# Patient Record
Sex: MALE | Race: WHITE | Employment: FULL TIME | URBAN - METROPOLITAN AREA
[De-identification: names, ages, dates, MRNs, and addresses within clinical notes are randomized per-mention and may not be internally consistent; named-entity substitution may affect disease eponyms.]

---

## 2017-04-24 ENCOUNTER — OFFICE VISIT (OUTPATIENT)
Dept: FAMILY MEDICINE | Facility: OTHER | Age: 42
End: 2017-04-24
Attending: NURSE PRACTITIONER
Payer: COMMERCIAL

## 2017-04-24 VITALS
SYSTOLIC BLOOD PRESSURE: 120 MMHG | HEART RATE: 98 BPM | BODY MASS INDEX: 36.68 KG/M2 | OXYGEN SATURATION: 98 % | HEIGHT: 71 IN | DIASTOLIC BLOOD PRESSURE: 71 MMHG | WEIGHT: 262 LBS | TEMPERATURE: 98 F

## 2017-04-24 DIAGNOSIS — F40.10 SOCIAL ANXIETY DISORDER: ICD-10-CM

## 2017-04-24 DIAGNOSIS — G43.719 INTRACTABLE CHRONIC MIGRAINE WITHOUT AURA AND WITHOUT STATUS MIGRAINOSUS: ICD-10-CM

## 2017-04-24 DIAGNOSIS — Z71.89 ACP (ADVANCE CARE PLANNING): ICD-10-CM

## 2017-04-24 DIAGNOSIS — F41.9 ANXIETY: ICD-10-CM

## 2017-04-24 DIAGNOSIS — F33.1 MODERATE EPISODE OF RECURRENT MAJOR DEPRESSIVE DISORDER (H): Primary | ICD-10-CM

## 2017-04-24 PROCEDURE — 99214 OFFICE O/P EST MOD 30 MIN: CPT | Performed by: NURSE PRACTITIONER

## 2017-04-24 RX ORDER — SUMATRIPTAN 50 MG/1
50 TABLET, FILM COATED ORAL
Qty: 30 TABLET | Refills: 3 | Status: SHIPPED | OUTPATIENT
Start: 2017-04-24 | End: 2019-01-09

## 2017-04-24 RX ORDER — VENLAFAXINE 75 MG/1
75 TABLET ORAL 3 TIMES DAILY
Qty: 90 TABLET | Refills: 5 | Status: SHIPPED | OUTPATIENT
Start: 2017-04-24 | End: 2017-11-13

## 2017-04-24 RX ORDER — ARIPIPRAZOLE 5 MG/1
5 TABLET ORAL AT BEDTIME
Qty: 30 TABLET | Refills: 1 | Status: SHIPPED | OUTPATIENT
Start: 2017-04-24 | End: 2017-11-13

## 2017-04-24 RX ORDER — CLONAZEPAM 1 MG/1
1 TABLET ORAL 3 TIMES DAILY PRN
Qty: 90 TABLET | Refills: 5 | Status: SHIPPED | OUTPATIENT
Start: 2017-04-24 | End: 2017-11-13

## 2017-04-24 ASSESSMENT — ANXIETY QUESTIONNAIRES
IF YOU CHECKED OFF ANY PROBLEMS ON THIS QUESTIONNAIRE, HOW DIFFICULT HAVE THESE PROBLEMS MADE IT FOR YOU TO DO YOUR WORK, TAKE CARE OF THINGS AT HOME, OR GET ALONG WITH OTHER PEOPLE: NOT DIFFICULT AT ALL
3. WORRYING TOO MUCH ABOUT DIFFERENT THINGS: SEVERAL DAYS
6. BECOMING EASILY ANNOYED OR IRRITABLE: NOT AT ALL
7. FEELING AFRAID AS IF SOMETHING AWFUL MIGHT HAPPEN: SEVERAL DAYS
1. FEELING NERVOUS, ANXIOUS, OR ON EDGE: SEVERAL DAYS
2. NOT BEING ABLE TO STOP OR CONTROL WORRYING: NOT AT ALL

## 2017-04-24 ASSESSMENT — PATIENT HEALTH QUESTIONNAIRE - PHQ9: 5. POOR APPETITE OR OVEREATING: NOT AT ALL

## 2017-04-24 ASSESSMENT — PAIN SCALES - GENERAL: PAINLEVEL: NO PAIN (0)

## 2017-04-24 NOTE — NURSING NOTE
"Chief Complaint   Patient presents with     Recheck Medication       Initial /71 (BP Location: Right arm, Cuff Size: Adult Large)  Pulse 98  Temp 98  F (36.7  C) (Tympanic)  Ht 5' 11\" (1.803 m)  Wt 262 lb (118.8 kg)  SpO2 98%  BMI 36.54 kg/m2 Estimated body mass index is 36.54 kg/(m^2) as calculated from the following:    Height as of this encounter: 5' 11\" (1.803 m).    Weight as of this encounter: 262 lb (118.8 kg).  Medication Reconciliation: complete     Lesley Ansari      "

## 2017-04-24 NOTE — PATIENT INSTRUCTIONS
ASSESSMENT AND PLAN    1. Moderate episode of recurrent major depressive disorder (H)  - ARIPiprazole (ABILIFY) 5 MG tablet; Take 1 tablet (5 mg) by mouth At Bedtime  Dispense: 30 tablet; Refill: 1  - clonazePAM (KLONOPIN) 1 MG tablet; Take 1 tablet (1 mg) by mouth 3 times daily as needed for anxiety  Dispense: 90 tablet; Refill: 5  - venlafaxine (EFFEXOR) 75 MG tablet; Take 1 tablet (75 mg) by mouth 3 times daily  Dispense: 90 tablet; Refill: 5    2. Anxiety  - clonazePAM (KLONOPIN) 1 MG tablet; Take 1 tablet (1 mg) by mouth 3 times daily as needed for anxiety  Dispense: 90 tablet; Refill: 5  - venlafaxine (EFFEXOR) 75 MG tablet; Take 1 tablet (75 mg) by mouth 3 times daily  Dispense: 90 tablet; Refill: 5    3. Intractable chronic migraine without aura and without status migrainosus  - SUMAtriptan (IMITREX) 50 MG tablet; Take 1 tablet (50 mg) by mouth at onset of headache for migraine TAKE 1 AT ONSET OF HEADACHE, MAY REPEAT AFTER 2 HR, NOT TO EXEED 200MG DAILY  Dispense: 30 tablet; Refill: 3    4. ACP (advance care planning)  - Addressed    5. Social anxiety disorder  - clonazePAM (KLONOPIN) 1 MG tablet; Take 1 tablet (1 mg) by mouth 3 times daily as needed for anxiety  Dispense: 90 tablet; Refill: 5  - venlafaxine (EFFEXOR) 75 MG tablet; Take 1 tablet (75 mg) by mouth 3 times daily  Dispense: 90 tablet; Refill: 5          Yelitza Roche [facility]  [phone]                                    My Depression Action Plan  Name: Toño Gutierrez   Date of Birth 1975  Date: 4/24/2017    My doctor: Yelitza Roche   My clinic: [address] [address]  [phone]          [facility]   Good Control    What it looks like:     Things are going generally well. You have normal up s and down s. You may even feel depressed from time to time, but bad moods usually last less than a day.   What you need to do:  1. Continue to care for yourself (see self care plan)  2. Check  your depression survival kit and update it as needed  3. Follow your physician s recommendations including any medication.  4. Do not stop taking medication unless you consult with your physician first.           YELLOW         ZONE Getting Worse    What it looks like:     Depression is starting to interfere with your life.     It may be hard to get out of bed; you may be starting to isolate yourself from others.    Symptoms of depression are starting to last most all day and this has happened for several days.     You may have suicidal thoughts but they are not constant.   What you need to do:     1. Call your care team, your response to treatment will improve if you keep your care team informed of your progress. Yellow periods are signs an adjustment may need to be made.     2. Continue your self-care, even if you have to fake it!    3. Talk to someone in your support network    4. Open up your depression survival kit           RED    ZONE Medical Alert - Get Help    What it looks like:     Depression is seriously interfering with your life.     You may experience these or other symptoms: You can t get out of bed most days, can t work or engage in other necessary activities, you have trouble taking care of basic hygiene, or basic responsibilities, thoughts of suicide or death that will not go away, self-injurious behavior.     What you need to do:  1. Call your care team and request a same-day appointment. If they are not available (weekends or after hours) call your local crisis line, emergency room or 911.      Electronically signed by: eYlitza Roche, April 24, 2017    Depression Self Care Plan / Survival Kit    Self-Care for Depression  Here s the deal. Your body and mind are really not as separate as most people think.  What you do and think affects how you feel and how you feel influences what you do and think. This means if you do things that people who feel good do, it will help you feel better.  Sometimes  this is all it takes.  There is also a place for medication and therapy depending on how severe your depression is, so be sure to consult with your medical provider and/ or Behavioral Health Consultant if your symptoms are worsening or not improving.     In order to better manage my stress, I will:    Exercise  Get some form of exercise, every day. This will help reduce pain and release endorphins, the  feel good  chemicals in your brain. This is almost as good as taking antidepressants!  This is not the same as joining a gym and then never going! (they count on that by the way ) It can be as simple as just going for a walk or doing some gardening, anything that will get you moving.      Hygiene   Maintain good hygiene (Get out of bed in the morning, Make your bed, Brush your teeth, Take a shower, and Get dressed like you were going to work, even if you are unemployed).  If your clothes don't fit try to get ones that do.    Diet  I will strive to eat foods that are good for me, drink plenty of water, and avoid excessive sugar, caffeine, alcohol, and other mood-altering substances.  Some foods that are helpful in depression are: complex carbohydrates, B vitamins, flaxseed, fish or fish oil, fresh fruits and vegetables.    Psychotherapy  I agree to participate in Individual Therapy (if recommended).    Medication  If prescribed medications, I agree to take them.  Missing doses can result in serious side effects.  I understand that drinking alcohol, or other illicit drug use, may cause potential side effects.  I will not stop my medication abruptly without first discussing it with my provider.    Staying Connected With Others  I will stay in touch with my friends, family members, and my primary care provider/team.    Use your imagination  Be creative.  We all have a creative side; it doesn t matter if it s oil painting, sand castles, or mud pies! This will also kick up the endorphins.    Witness Beauty  (AKA stop and  smell the roses) Take a look outside, even in mid-winter. Notice colors, textures. Watch the squirrels and birds.     Service to others  Be of service to others.  There is always someone else in need.  By helping others we can  get out of ourselves  and remember the really important things.  This also provides opportunities for practicing all the other parts of the program.    Humor  Laugh and be silly!  Adjust your TV habits for less news and crime-drama and more comedy.    Control your stress  Try breathing deep, massage therapy, biofeedback, and meditation. Find time to relax each day.     My support system    Clinic Contact:  Phone number:    Contact 1:  Phone number:    Contact 2:  Phone number:    Hindu/:  Phone number:    Therapist:  Phone number:    Local crisis center:    Phone number:    Other community support:  Phone number:

## 2017-04-24 NOTE — PROGRESS NOTES
OUTPATIENT VISIT NOTE      CHIEF COMPLAINT:     Chief Complaint   Patient presents with     Recheck Medication        SUBJECTIVE  Patient presents with a chief complaint of  Deression, anxiety        HPI:  Symptoms have been present for -  years  Aggravating factors  - life  Relieving factors -  meds  Recent stressors  - job loss  Drug or alcohol use -  no  Past medications  - see chart  Therapy  - no  Psychiatric history -  As in epic  Prior hospitalization for mental health concerns -  no  Suicidal Ideation or plan  - no      Migraines, stable, on Imitrex - no concerns    Past Medical History:   Diagnosis Date     Anxiety 7/23/2012     Chronic migraine without aura, with intractable migraine, so stated, without mention of status migrainosus 10/13/2014     Depression, major, recurrent 7/23/2012     ED (erectile dysfunction) 10/13/2014     Social anxiety disorder 10/13/2014     Past Surgical History:   Procedure Laterality Date     CIRCUMCISION       ORTHOPEDIC SURGERY  2003    rt ruthie teixeira'ed and pinned     ring finger open reduction internal fixation      right     Family History   Problem Relation Age of Onset     Crohn Disease Sister      Hypertension Maternal Grandmother      Social History     Social History     Marital status: Single     Spouse name: N/A     Number of children: N/A     Years of education: N/A     Occupational History     [employer]      [employer]     Social History Main Topics     Smoking status: Former Smoker     Packs/day: 0.50     Types: Cigarettes     Smokeless tobacco: Former User     Quit date: 2/13/2014     Alcohol use Yes     Drug use: No     Sexual activity: Not on file     Other Topics Concern     Blood Transfusions Yes     Caffeine Concern Yes     2 cups energy drinks daily     Exercise Yes     walking, cycling     Parent/Sibling W/ Cabg, Mi Or Angioplasty Before 65f 55m? Yes     Social History Narrative     Allergies   Allergen Reactions     Erythromycin Base  "[Kdc:Yellow Dye+Erythromycin+Brilliant Blue Fcf]        Current Outpatient Prescriptions   Medication     ARIPiprazole (ABILIFY) 5 MG tablet     venlafaxine (EFFEXOR) 75 MG tablet     clonazePAM (KLONOPIN) 1 MG tablet     SUMAtriptan (IMITREX) 50 MG tablet     No current facility-administered medications for this visit.          REVIEW OF SYSTEMS  Skin: negative  Eyes: negative  Ears/Nose/Throat: negative  Respiratory: No shortness of breath, dyspnea on exertion, cough, or hemoptysis  Cardiovascular: negative  Gastrointestinal: negative  Musculoskeletal: negative  Neurologic: negative  Psychiatric: As above  Endocrine: negative      OBJECTIVE:   /71 (BP Location: Right arm, Cuff Size: Adult Large)  Pulse 98  Temp 98  F (36.7  C) (Tympanic)  Ht 5' 11\" (1.803 m)  Wt 262 lb (118.8 kg)  SpO2 98%  BMI 36.54 kg/m2    Mental Status Assessment:  -Appearance/Behavior:No apparent distress and Casually groomed, attitude:pleasant and cooperative  -Motor: normal or unremarkable.  -Gait: Normal.   -Abnormal involuntary movements: None.  -Mood: description consistent with euthymia.  -Affect: Reactive/Full range.  -Speech: Normal, clear, regular rate and volume.   -Thought process/associations: Logical and Goal directed.  -Thought content: normal .  -Perceptual disturbances: No hallucinations..   -Suicidal/Homicidal Ideation: Denies  -Judgment: Good.  -Insight: Good.  *Orientation: time, place and person.  *Memory: intact immediate, short and long term.  *Attention: Adequate for interview  *Language: fluent, no aphasias, able to repeat phrases and name objects. Vocab intact.  *Fund of information: appropriate for education  *Cognitive functioning estimate: 0 - independent.        Physical Exam:  Head: Normocephalic.   Neck: Neck supple. No adenopathy.   ENT: ENT exam normal, no neck nodes or sinus tenderness  Cardiovascular: negative, PMI normal.  No murmurs, clicks gallops or rub  Respiratory:  Good diaphragmatic " excursion. Lungs clear  Abdomen: Soft, non tender, active bowel sounds  Neurologic: Gait normal.  Sensation grossly  Skin: Normal, no cuts, or other notable abnormal findings    PHQ-9 SCORE 9/23/2015 10/4/2016 11/4/2016   Total Score - - -   Total Score 1 4 7     AMRIT-7 SCORE 10/4/2016 11/4/2016   Total Score 9 3           ASSESSMENT AND PLAN    1. Moderate episode of recurrent major depressive disorder (H)  - ARIPiprazole (ABILIFY) 5 MG tablet; Take 1 tablet (5 mg) by mouth At Bedtime  Dispense: 30 tablet; Refill: 1  - clonazePAM (KLONOPIN) 1 MG tablet; Take 1 tablet (1 mg) by mouth 3 times daily as needed for anxiety  Dispense: 90 tablet; Refill: 5  - venlafaxine (EFFEXOR) 75 MG tablet; Take 1 tablet (75 mg) by mouth 3 times daily  Dispense: 90 tablet; Refill: 5    2. Anxiety  - clonazePAM (KLONOPIN) 1 MG tablet; Take 1 tablet (1 mg) by mouth 3 times daily as needed for anxiety  Dispense: 90 tablet; Refill: 5  - venlafaxine (EFFEXOR) 75 MG tablet; Take 1 tablet (75 mg) by mouth 3 times daily  Dispense: 90 tablet; Refill: 5    3. Intractable chronic migraine without aura and without status migrainosus  - SUMAtriptan (IMITREX) 50 MG tablet; Take 1 tablet (50 mg) by mouth at onset of headache for migraine TAKE 1 AT ONSET OF HEADACHE, MAY REPEAT AFTER 2 HR, NOT TO EXEED 200MG DAILY  Dispense: 30 tablet; Refill: 3    4. ACP (advance care planning)  - Addressed    5. Social anxiety disorder  - clonazePAM (KLONOPIN) 1 MG tablet; Take 1 tablet (1 mg) by mouth 3 times daily as needed for anxiety  Dispense: 90 tablet; Refill: 5  - venlafaxine (EFFEXOR) 75 MG tablet; Take 1 tablet (75 mg) by mouth 3 times daily  Dispense: 90 tablet; Refill: 5          Yelitza Roche [facility]  [phone]

## 2017-04-24 NOTE — MR AVS SNAPSHOT
After Visit Summary   4/24/2017    Toño Gutierrez    MRN: [id number]           Patient Information     Date Of Birth          1975        Visit Information        Provider Department      4/24/2017 11:00 AM Yelitza Roche NP [facility]'s Diagnoses     Moderate episode of recurrent major depressive disorder (H)    -  1    Anxiety        Intractable chronic migraine without aura and without status migrainosus        ACP (advance care planning)        Social anxiety disorder          Care Instructions        ASSESSMENT AND PLAN    1. Moderate episode of recurrent major depressive disorder (H)  - ARIPiprazole (ABILIFY) 5 MG tablet; Take 1 tablet (5 mg) by mouth At Bedtime  Dispense: 30 tablet; Refill: 1  - clonazePAM (KLONOPIN) 1 MG tablet; Take 1 tablet (1 mg) by mouth 3 times daily as needed for anxiety  Dispense: 90 tablet; Refill: 5  - venlafaxine (EFFEXOR) 75 MG tablet; Take 1 tablet (75 mg) by mouth 3 times daily  Dispense: 90 tablet; Refill: 5    2. Anxiety  - clonazePAM (KLONOPIN) 1 MG tablet; Take 1 tablet (1 mg) by mouth 3 times daily as needed for anxiety  Dispense: 90 tablet; Refill: 5  - venlafaxine (EFFEXOR) 75 MG tablet; Take 1 tablet (75 mg) by mouth 3 times daily  Dispense: 90 tablet; Refill: 5    3. Intractable chronic migraine without aura and without status migrainosus  - SUMAtriptan (IMITREX) 50 MG tablet; Take 1 tablet (50 mg) by mouth at onset of headache for migraine TAKE 1 AT ONSET OF HEADACHE, MAY REPEAT AFTER 2 HR, NOT TO EXEED 200MG DAILY  Dispense: 30 tablet; Refill: 3    4. ACP (advance care planning)  - Addressed    5. Social anxiety disorder  - clonazePAM (KLONOPIN) 1 MG tablet; Take 1 tablet (1 mg) by mouth 3 times daily as needed for anxiety  Dispense: 90 tablet; Refill: 5  - venlafaxine (EFFEXOR) 75 MG tablet; Take 1 tablet (75 mg) by mouth 3 times daily  Dispense: 90 tablet; Refill: 5          Yelitza Roche [facility]  [phone]                                     My Depression Action Plan  Name: Toño Gutierrez   Date of Birth 1975  Date: 4/24/2017    My doctor: Yelitza Roche   My clinic: [facility]  [address] [place] [address] [address]  [facility] [address]  [phone]          GREEN    ZONE   Good Control    What it looks like:     Things are going generally well. You have normal up s and down s. You may even feel depressed from time to time, but bad moods usually last less than a day.   What you need to do:  1. Continue to care for yourself (see self care plan)  2. Check your depression survival kit and update it as needed  3. Follow your physician s recommendations including any medication.  4. Do not stop taking medication unless you consult with your physician first.           YELLOW         ZONE Getting Worse    What it looks like:     Depression is starting to interfere with your life.     It may be hard to get out of bed; you may be starting to isolate yourself from others.    Symptoms of depression are starting to last most all day and this has happened for several days.     You may have suicidal thoughts but they are not constant.   What you need to do:     1. Call your care team, your response to treatment will improve if you keep your care team informed of your progress. Yellow periods are signs an adjustment may need to be made.     2. Continue your self-care, even if you have to fake it!    3. Talk to someone in your support network    4. Open up your depression survival kit           RED    ZONE Medical Alert - Get Help    What it looks like:     Depression is seriously interfering with your life.     You may experience these or other symptoms: You can t get out of bed most days, can t work or engage in other necessary activities, you have trouble taking care of basic hygiene, or basic responsibilities, thoughts of suicide or death that will not go away, self-injurious behavior.     What you need to do:  1. Call your care team  and request a same-day appointment. If they are not available (weekends or after hours) call your local crisis line, emergency room or 911.      Electronically signed by: Yelitza Roche, April 24, 2017    Depression Self Care Plan / Survival Kit    Self-Care for Depression  Here s the deal. Your body and mind are really not as separate as most people think.  What you do and think affects how you feel and how you feel influences what you do and think. This means if you do things that people who feel good do, it will help you feel better.  Sometimes this is all it takes.  There is also a place for medication and therapy depending on how severe your depression is, so be sure to consult with your medical provider and/ or Behavioral Health Consultant if your symptoms are worsening or not improving.     In order to better manage my stress, I will:    Exercise  Get some form of exercise, every day. This will help reduce pain and release endorphins, the  feel good  chemicals in your brain. This is almost as good as taking antidepressants!  This is not the same as joining a gym and then never going! (they count on that by the way ) It can be as simple as just going for a walk or doing some gardening, anything that will get you moving.      Hygiene   Maintain good hygiene (Get out of bed in the morning, Make your bed, Brush your teeth, Take a shower, and Get dressed like you were going to work, even if you are unemployed).  If your clothes don't fit try to get ones that do.    Diet  I will strive to eat foods that are good for me, drink plenty of water, and avoid excessive sugar, caffeine, alcohol, and other mood-altering substances.  Some foods that are helpful in depression are: complex carbohydrates, B vitamins, flaxseed, fish or fish oil, fresh fruits and vegetables.    Psychotherapy  I agree to participate in Individual Therapy (if recommended).    Medication  If prescribed medications, I agree to take them.  Missing doses  can result in serious side effects.  I understand that drinking alcohol, or other illicit drug use, may cause potential side effects.  I will not stop my medication abruptly without first discussing it with my provider.    Staying Connected With Others  I will stay in touch with my friends, family members, and my primary care provider/team.    Use your imagination  Be creative.  We all have a creative side; it doesn t matter if it s oil painting, sand castles, or mud pies! This will also kick up the endorphins.    Witness Beauty  (AKA stop and smell the roses) Take a look outside, even in mid-winter. Notice colors, textures. Watch the squirrels and birds.     Service to others  Be of service to others.  There is always someone else in need.  By helping others we can  get out of ourselves  and remember the really important things.  This also provides opportunities for practicing all the other parts of the program.    Humor  Laugh and be silly!  Adjust your TV habits for less news and crime-drama and more comedy.    Control your stress  Try breathing deep, massage therapy, biofeedback, and meditation. Find time to relax each day.     My support system    Clinic Contact:  Phone number:    Contact 1:  Phone number:    Contact 2:  Phone number:    [employer]:  Phone number:    Therapist:  Phone number:    [facility] center:    Phone number:    Other community support:  Phone number:            Follow-ups after your visit        Who to contact     If you have questions or need follow up information about today's clinic visit or your schedule please contact [facility] directly at [phone].  Normal or non-critical lab and imaging results will be communicated to you by MyChart, letter or phone within 4 business days after the clinic has received the results. If you do not hear from us within 7 days, please contact the clinic through MyChart or phone. If you have a critical or abnormal lab  "result, we will notify you by phone as soon as possible.  Submit refill requests through UpNext or call your pharmacy and they will forward the refill request to us. Please allow 3 business days for your refill to be completed.          Additional Information About Your Visit        NeuroQuesthart Information     UpNext lets you send messages to your doctor, view your test results, renew your prescriptions, schedule appointments and more. To sign up, go to [email or web address] . Click on \"Log in\" on the left side of the screen, which will take you to the Welcome page. Then click on \"Sign up Now\" on the right side of the page.     You will be asked to enter the access code listed below, as well as some personal information. Please follow the directions to create your username and password.     Your access code is: [email or web address]  Expires: 2017 11:42 AM     Your access code will  in 90 days. If you need help or a new code, please call your [place] clinic or [phone].        Care EveryWhere ID     This is your Care EveryWhere ID. This could be used by other organizations to access your [place] medical records  [id number]        Your Vitals Were     Pulse Temperature Height Pulse Oximetry BMI (Body Mass Index)       98 98  F (36.7  C) (Tympanic) 5' 11\" (1.803 m) 98% 36.54 kg/m2        Blood Pressure from Last 3 Encounters:   17 120/71   16 (!) 128/100   10/04/16 120/90    Weight from Last 3 Encounters:   17 262 lb (118.8 kg)   16 244 lb (110.7 kg)   10/04/16 242 lb 6.4 oz (110 kg)              Today, you had the following     No orders found for display         Where to get your medicines      These medications were sent to [id number] [place] [facility]  [address] AT [facility] [address] [address]     Phone:  [phone]     ARIPiprazole 5 MG tablet    venlafaxine 75 MG tablet         Some of these will need a paper " prescription and others can be bought over the counter.  Ask your nurse if you have questions.     Bring a paper prescription for each of these medications     clonazePAM 1 MG tablet    SUMAtriptan 50 MG tablet          Primary Care Provider Office Phone # Fax #    Yelitza RocheAAKASH [phone]       [address] [address]        Thank you!     Thank you for choosing [facility]  for your care. Our goal is always to provide you with excellent care. Hearing back from our patients is one way we can continue to improve our services. Please take a few minutes to complete the written survey that you may receive in the mail after your visit with us. Thank you!             Your Updated Medication List - Protect others around you: Learn how to safely use, store and throw away your medicines at www.disposemymeds.org.          This list is accurate as of: 4/24/17 11:42 AM.  Always use your most recent med list.                   Brand Name Dispense Instructions for use    ARIPiprazole 5 MG tablet    ABILIFY    30 tablet    Take 1 tablet (5 mg) by mouth At Bedtime       clonazePAM 1 MG tablet    klonoPIN    90 tablet    Take 1 tablet (1 mg) by mouth 3 times daily as needed for anxiety       SUMAtriptan 50 MG tablet    IMITREX    30 tablet    Take 1 tablet (50 mg) by mouth at onset of headache for migraine TAKE 1 AT ONSET OF HEADACHE, MAY REPEAT AFTER 2 HR, NOT TO EXEED 200MG DAILY       venlafaxine 75 MG tablet    EFFEXOR    90 tablet    Take 1 tablet (75 mg) by mouth 3 times daily

## 2017-04-25 ASSESSMENT — PATIENT HEALTH QUESTIONNAIRE - PHQ9: SUM OF ALL RESPONSES TO PHQ QUESTIONS 1-9: 2

## 2017-05-27 DIAGNOSIS — F40.10 SOCIAL ANXIETY DISORDER: ICD-10-CM

## 2017-05-27 DIAGNOSIS — F33.1 MODERATE EPISODE OF RECURRENT MAJOR DEPRESSIVE DISORDER (H): ICD-10-CM

## 2017-05-27 DIAGNOSIS — F41.9 ANXIETY: ICD-10-CM

## 2017-05-30 RX ORDER — CLONAZEPAM 1 MG/1
TABLET ORAL
Qty: 90 TABLET | Refills: 0 | OUTPATIENT
Start: 2017-05-30

## 2017-11-13 ENCOUNTER — OFFICE VISIT (OUTPATIENT)
Dept: FAMILY MEDICINE | Facility: OTHER | Age: 42
End: 2017-11-13
Attending: NURSE PRACTITIONER
Payer: COMMERCIAL

## 2017-11-13 VITALS
SYSTOLIC BLOOD PRESSURE: 138 MMHG | DIASTOLIC BLOOD PRESSURE: 102 MMHG | HEART RATE: 80 BPM | WEIGHT: 265 LBS | BODY MASS INDEX: 37.1 KG/M2 | RESPIRATION RATE: 14 BRPM | HEIGHT: 71 IN

## 2017-11-13 DIAGNOSIS — F41.9 ANXIETY: ICD-10-CM

## 2017-11-13 DIAGNOSIS — F33.1 MODERATE EPISODE OF RECURRENT MAJOR DEPRESSIVE DISORDER (H): ICD-10-CM

## 2017-11-13 DIAGNOSIS — I10 BENIGN ESSENTIAL HYPERTENSION: ICD-10-CM

## 2017-11-13 DIAGNOSIS — F40.10 SOCIAL ANXIETY DISORDER: ICD-10-CM

## 2017-11-13 DIAGNOSIS — Z23 NEED FOR PROPHYLACTIC VACCINATION AND INOCULATION AGAINST INFLUENZA: Primary | ICD-10-CM

## 2017-11-13 PROCEDURE — 99214 OFFICE O/P EST MOD 30 MIN: CPT | Performed by: NURSE PRACTITIONER

## 2017-11-13 PROCEDURE — 84443 ASSAY THYROID STIM HORMONE: CPT | Mod: ZL | Performed by: NURSE PRACTITIONER

## 2017-11-13 PROCEDURE — 90686 IIV4 VACC NO PRSV 0.5 ML IM: CPT | Performed by: NURSE PRACTITIONER

## 2017-11-13 PROCEDURE — 80048 BASIC METABOLIC PNL TOTAL CA: CPT | Mod: ZL | Performed by: NURSE PRACTITIONER

## 2017-11-13 PROCEDURE — 90471 IMMUNIZATION ADMIN: CPT | Performed by: NURSE PRACTITIONER

## 2017-11-13 PROCEDURE — 36415 COLL VENOUS BLD VENIPUNCTURE: CPT | Mod: ZL | Performed by: NURSE PRACTITIONER

## 2017-11-13 PROCEDURE — 99212 OFFICE O/P EST SF 10 MIN: CPT | Mod: 25

## 2017-11-13 RX ORDER — CLONAZEPAM 1 MG/1
1 TABLET ORAL 3 TIMES DAILY PRN
Qty: 90 TABLET | Refills: 5 | Status: SHIPPED | OUTPATIENT
Start: 2017-11-13 | End: 2018-03-20

## 2017-11-13 RX ORDER — LISINOPRIL 5 MG/1
5 TABLET ORAL DAILY
Qty: 30 TABLET | Refills: 1 | Status: SHIPPED | OUTPATIENT
Start: 2017-11-13 | End: 2018-01-08

## 2017-11-13 RX ORDER — VENLAFAXINE 75 MG/1
75 TABLET ORAL 3 TIMES DAILY
Qty: 90 TABLET | Refills: 5 | Status: SHIPPED | OUTPATIENT
Start: 2017-11-13 | End: 2018-03-20

## 2017-11-13 ASSESSMENT — ANXIETY QUESTIONNAIRES
5. BEING SO RESTLESS THAT IT IS HARD TO SIT STILL: NOT AT ALL
4. TROUBLE RELAXING: NOT AT ALL
2. NOT BEING ABLE TO STOP OR CONTROL WORRYING: NOT AT ALL
IF YOU CHECKED OFF ANY PROBLEMS ON THIS QUESTIONNAIRE, HOW DIFFICULT HAVE THESE PROBLEMS MADE IT FOR YOU TO DO YOUR WORK, TAKE CARE OF THINGS AT HOME, OR GET ALONG WITH OTHER PEOPLE: NOT DIFFICULT AT ALL
3. WORRYING TOO MUCH ABOUT DIFFERENT THINGS: SEVERAL DAYS
1. FEELING NERVOUS, ANXIOUS, OR ON EDGE: SEVERAL DAYS
6. BECOMING EASILY ANNOYED OR IRRITABLE: NOT AT ALL
GAD7 TOTAL SCORE: 4
7. FEELING AFRAID AS IF SOMETHING AWFUL MIGHT HAPPEN: MORE THAN HALF THE DAYS

## 2017-11-13 ASSESSMENT — PATIENT HEALTH QUESTIONNAIRE - PHQ9: SUM OF ALL RESPONSES TO PHQ QUESTIONS 1-9: 1

## 2017-11-13 NOTE — NURSING NOTE
"Chief Complaint   Patient presents with     Depression     with anxiety       Initial BP (!) 138/102 (BP Location: Left arm, Patient Position: Sitting, Cuff Size: Adult Large)  Pulse 80  Resp 14  Ht 5' 11\" (1.803 m)  Wt 265 lb (120.2 kg)  BMI 36.96 kg/m2 Estimated body mass index is 36.96 kg/(m^2) as calculated from the following:    Height as of this encounter: 5' 11\" (1.803 m).    Weight as of this encounter: 265 lb (120.2 kg).  Medication Reconciliation: complete       Lesli Jeong      "

## 2017-11-13 NOTE — LETTER
My Migraine Action Plan      Date: 11/13/2017     My Name: Toño Gutierrez   YOB: 1975  My Pharmacy: [facility] [id number] [place] [address] [address]  AT [facility] [address]       My (Preventative) Control Medicine: nothing          My Rescue Medicine: Imitrex    My Doctor: Yelitza Roche     My Clinic: [facility]  [address]  [facility] [address]  [phone]        GREEN ZONE = Good Control    My headache plan is working.   I can do what I need to do.           I WILL:     ? Keep managing my triggers.  ? Write in my migraine diary each time I have a headache.  ? Keep taking my preventive (controller) medicine daily.  ? Take my relief and rescue medicine as needed.             YELLOW ZONE = Not Enough Control    My headache plan isn t always working.   My headaches keep me from doing   some of the things I need to do.       I WILL:     ? Set goals to control my triggers and act on them.  ? Write in my migraine diary each time I have a headache and review it for                      patterns or new triggers.  ? Keep taking my preventive (controller) medicine daily.  ? Take my relief and rescue medicine as needed.  ? Call my doctor or clinic at if I stay in the Yellow Zone.             RED ZONE = Poor or No Control    My headache plan has  failed. I can t do anything  when I have one. My  medicines aren t working.           I WILL:   ? Set goals to control my triggers and act on them.  ? Write in my migraine diary each time I have a headache and review it for                      patterns or new triggers.  ? Keep taking my preventive (controller) medicine daily.  ? Take my relief and rescue medicine as needed.  ? Call my doctor or clinic or go to urgent care or an ER if I m having the worst                  headache of my life.  ? Call my doctor or clinic or go to urgent care or an ER if my medicine doesn t work.  ? Let my doctor or clinic know within 2  weeks if I have gone to an urgent care or             emergency department.          Provider specific instructions:  ***

## 2017-11-13 NOTE — LETTER
My Migraine Action Plan      Date: 11/13/2017     My Name: Toño Gutierrez   YOB: 1975  My Pharmacy: [facility] [id number] [place] [address] [address]  AT [facility] [address]       My (Preventative) Control Medicine: none        My Rescue Medicine: Imitrex as directed   My Doctor: Yelitza Roche     My Clinic: [facility]  [address]  [facility] [address]  [phone]        GREEN ZONE = Good Control    My headache plan is working.   I can do what I need to do.           I WILL:     ? Keep managing my triggers.  ? Write in my migraine diary each time I have a headache.  ? Keep taking my preventive (controller) medicine daily.  ? Take my relief and rescue medicine as needed.             YELLOW ZONE = Not Enough Control    My headache plan isn t always working.   My headaches keep me from doing   some of the things I need to do.       I WILL:     ? Set goals to control my triggers and act on them.  ? Write in my migraine diary each time I have a headache and review it for                      patterns or new triggers.  ? Keep taking my preventive (controller) medicine daily.  ? Take my relief and rescue medicine as needed.  ? Call my doctor or clinic at if I stay in the Yellow Zone.             RED ZONE = Poor or No Control    My headache plan has  failed. I can t do anything  when I have one. My  medicines aren t working.           I WILL:   ? Set goals to control my triggers and act on them.  ? Write in my migraine diary each time I have a headache and review it for                      patterns or new triggers.  ? Keep taking my preventive (controller) medicine daily.  ? Take my relief and rescue medicine as needed.  ? Call my doctor or clinic or go to urgent care or an ER if I m having the worst                  headache of my life.  ? Call my doctor or clinic or go to urgent care or an ER if my medicine doesn t work.  ? Let my doctor or clinic know  within 2 weeks if I have gone to an urgent care or             emergency department.          Provider specific instructions:  No

## 2017-11-13 NOTE — MR AVS SNAPSHOT
After Visit Summary   11/13/2017    Toño Gutierrez    MRN: [id number]           Patient Information     Date Of Birth          1975        Visit Information        Provider Department      11/13/2017 2:45 PM Yelitza Roche NP [facility]        Today's Diagnoses     Need for prophylactic vaccination and inoculation against influenza    -  1    Social anxiety disorder        Moderate episode of recurrent major depressive disorder (H)        Anxiety        Benign essential hypertension          Care Instructions      1. Need for prophylactic vaccination and inoculation against influenza  - FLU VAC, SPLIT VIRUS IM > 3 YO (QUADRIVALENT) [30011]  - Vaccine Administration, Initial [53485]    2. Social anxiety disorder  - clonazePAM (KLONOPIN) 1 MG tablet; Take 1 tablet (1 mg) by mouth 3 times daily as needed for anxiety  Dispense: 90 tablet; Refill: 5  - venlafaxine (EFFEXOR) 75 MG tablet; Take 1 tablet (75 mg) by mouth 3 times daily  Dispense: 90 tablet; Refill: 5    3. Moderate episode of recurrent major depressive disorder (H)  - venlafaxine (EFFEXOR) 75 MG tablet; Take 1 tablet (75 mg) by mouth 3 times daily  Dispense: 90 tablet; Refill: 5    4. Anxiety  - clonazePAM (KLONOPIN) 1 MG tablet; Take 1 tablet (1 mg) by mouth 3 times daily as needed for anxiety  Dispense: 90 tablet; Refill: 5  - venlafaxine (EFFEXOR) 75 MG tablet; Take 1 tablet (75 mg) by mouth 3 times daily  Dispense: 90 tablet; Refill: 5    5. Benign essential hypertension - goal is 120's/70's  - lisinopril (PRINIVIL/ZESTRIL) 5 MG tablet; Take 1 tablet (5 mg) by mouth daily  Dispense: 30 tablet; Refill: 1  - TSH with free T4 reflex  - Basic metabolic panel  - Call my nurse Alyssa with a BP reading in 1 week and we will go from there.  - Baby aspirin - 81mg EC daily          Yelitza Roche NP  [facility]                Follow-ups after your visit        Who to contact     If you have questions or need follow up  "information about today's clinic visit or your schedule please contact [facility] directly at [phone].  Normal or non-critical lab and imaging results will be communicated to you by MyChart, letter or phone within 4 business days after the clinic has received the results. If you do not hear from us within 7 days, please contact the clinic through [email or web address] or phone. If you have a critical or abnormal lab result, we will notify you by phone as soon as possible.  Submit refill requests through Technimotion or call your pharmacy and they will forward the refill request to us. Please allow 3 business days for your refill to be completed.          Additional Information About Your Visit        MyChart Information     Technimotion lets you send messages to your doctor, view your test results, renew your prescriptions, schedule appointments and more. To sign up, go to [email or web address] . Click on \"Log in\" on the left side of the screen, which will take you to the Welcome page. Then click on \"Sign up Now\" on the right side of the page.     You will be asked to enter the access code listed below, as well as some personal information. Please follow the directions to create your username and password.     Your access code is: [email or web address]  Expires: 2018  3:41 PM     Your access code will  in 90 days. If you need help or a new code, please call your [place] clinic or [phone].        Care EveryWhere ID     This is your Care EveryWhere ID. This could be used by other organizations to access your [place] medical records  [id number]        Your Vitals Were     Pulse Respirations Height BMI (Body Mass Index)          80 14 5' 11\" (1.803 m) 36.96 kg/m2         Blood Pressure from Last 3 Encounters:   17 (!) 138/102   17 120/71   16 (!) 128/100    Weight from Last 3 Encounters:   17 265 lb (120.2 kg)   17 262 lb (118.8 kg)   16 244 lb (110.7 kg)              We " Performed the Following     Basic metabolic panel     FLU VAC, SPLIT VIRUS IM > 3 YO (QUADRIVALENT) [64646]     TSH with free T4 reflex     Vaccine Administration, Initial [50597]          Today's Medication Changes          These changes are accurate as of: 11/13/17  3:41 PM.  If you have any questions, ask your nurse or doctor.               Start taking these medicines.        Dose/Directions    aspirin 81 MG EC tablet   Used for:  Benign essential hypertension   Started by:  Yelitza Roche NP        Dose:  81 mg   Take 1 tablet (81 mg) by mouth daily   Quantity:  90 tablet   Refills:  11       lisinopril 5 MG tablet   Commonly known as:  PRINIVIL/ZESTRIL   Used for:  Benign essential hypertension   Started by:  Yelitza Roche NP        Dose:  5 mg   Take 1 tablet (5 mg) by mouth daily   Quantity:  30 tablet   Refills:  1            Where to get your medicines      These medications were sent to [facility] [id number] [place], [place] [address] [place]  AT [facility] [address] [place] [address]     Phone:  [phone]     lisinopril 5 MG tablet    venlafaxine 75 MG tablet         Some of these will need a paper prescription and others can be bought over the counter.  Ask your nurse if you have questions.     Bring a paper prescription for each of these medications     clonazePAM 1 MG tablet       You don't need a prescription for these medications     aspirin 81 MG EC tablet                Primary Care Provider Office Phone # Fax #    Yelitza Roche [phone]       [address] [address]        Equal Access to Services     [facility] HospitalROWAN AH: Hadii aad ku hadasho Soneriali, waaxda luqadaha, qaybta kaalmada adeegyada, lin henry. So [facility] [phone].    ATENCIÓN: Si habla español, tiene a juarez disposición servicios gratuitos de asistencia lingüística. Llame al [phone].    We comply with applicable federal  civil rights laws and [place] laws. We do not discriminate on the basis of race, color, national origin, age, disability, sex, sexual orientation, or gender identity.            Thank you!     Thank you for choosing [facility]  for your care. Our goal is always to provide you with excellent care. Hearing back from our patients is one way we can continue to improve our services. Please take a few minutes to complete the written survey that you may receive in the mail after your visit with us. Thank you!             Your Updated Medication List - Protect others around you: Learn how to safely use, store and throw away your medicines at www.disposemymeds.org.          This list is accurate as of: 11/13/17  3:41 PM.  Always use your most recent med list.                   Brand Name Dispense Instructions for use Diagnosis    aspirin 81 MG EC tablet     90 tablet    Take 1 tablet (81 mg) by mouth daily    Benign essential hypertension       clonazePAM 1 MG tablet    klonoPIN    90 tablet    Take 1 tablet (1 mg) by mouth 3 times daily as needed for anxiety    Social anxiety disorder, Moderate episode of recurrent major depressive disorder (H), Anxiety       lisinopril 5 MG tablet    PRINIVIL/ZESTRIL    30 tablet    Take 1 tablet (5 mg) by mouth daily    Benign essential hypertension       SUMAtriptan 50 MG tablet    IMITREX    30 tablet    Take 1 tablet (50 mg) by mouth at onset of headache for migraine TAKE 1 AT ONSET OF HEADACHE, MAY REPEAT AFTER 2 HR, NOT TO EXEED 200MG DAILY    Intractable chronic migraine without aura and without status migrainosus       venlafaxine 75 MG tablet    EFFEXOR    90 tablet    Take 1 tablet (75 mg) by mouth 3 times daily    Social anxiety disorder, Moderate episode of recurrent major depressive disorder (H), Anxiety

## 2017-11-13 NOTE — LETTER
My Depression Action Plan  Name: Toño Gutierrez   Date of Birth 1975  Date: 11/13/2017    My doctor: Yelitza Roche   My clinic: [facility]  [address] [place] [address] [address]  [facility] [address]  [phone]          GREEN    ZONE   Good Control    What it looks like:     Things are going generally well. You have normal up s and down s. You may even feel depressed from time to time, but bad moods usually last less than a day.   What you need to do:  1. Continue to care for yourself (see self care plan)  2. Check your depression survival kit and update it as needed  3. Follow your physician s recommendations including any medication.  4. Do not stop taking medication unless you consult with your physician first.           YELLOW         ZONE Getting Worse    What it looks like:     Depression is starting to interfere with your life.     It may be hard to get out of bed; you may be starting to isolate yourself from others.    Symptoms of depression are starting to last most all day and this has happened for several days.     You may have suicidal thoughts but they are not constant.   What you need to do:     1. Call your care team, your response to treatment will improve if you keep your care team informed of your progress. Yellow periods are signs an adjustment may need to be made.     2. Continue your self-care, even if you have to fake it!    3. Talk to someone in your support network    4. Open up your depression survival kit           RED    ZONE Medical Alert - Get Help    What it looks like:     Depression is seriously interfering with your life.     You may experience these or other symptoms: You can t get out of bed most days, can t work or engage in other necessary activities, you have trouble taking care of basic hygiene, or basic responsibilities, thoughts of suicide or death that will not go away, self-injurious behavior.     What you need to do:  1. Call your care team and  request a same-day appointment. If they are not available (weekends or after hours) call your local crisis line, emergency room or 911.      Electronically signed by: Yelitza Roche, November 13, 2017    Depression Self Care Plan / Survival Kit    Self-Care for Depression  Here s the deal. Your body and mind are really not as separate as most people think.  What you do and think affects how you feel and how you feel influences what you do and think. This means if you do things that people who feel good do, it will help you feel better.  Sometimes this is all it takes.  There is also a place for medication and therapy depending on how severe your depression is, so be sure to consult with your medical provider and/ or Behavioral Health Consultant if your symptoms are worsening or not improving.     In order to better manage my stress, I will:    Exercise  Get some form of exercise, every day. This will help reduce pain and release endorphins, the  feel good  chemicals in your brain. This is almost as good as taking antidepressants!  This is not the same as joining a gym and then never going! (they count on that by the way ) It can be as simple as just going for a walk or doing some gardening, anything that will get you moving.      Hygiene   Maintain good hygiene (Get out of bed in the morning, Make your bed, Brush your teeth, Take a shower, and Get dressed like you were going to work, even if you are unemployed).  If your clothes don't fit try to get ones that do.    Diet  I will strive to eat foods that are good for me, drink plenty of water, and avoid excessive sugar, caffeine, alcohol, and other mood-altering substances.  Some foods that are helpful in depression are: complex carbohydrates, B vitamins, flaxseed, fish or fish oil, fresh fruits and vegetables.    Psychotherapy  I agree to participate in Individual Therapy (if recommended).    Medication  If prescribed medications, I agree to take them.  Missing doses  can result in serious side effects.  I understand that drinking alcohol, or other illicit drug use, may cause potential side effects.  I will not stop my medication abruptly without first discussing it with my provider.    Staying Connected With Others  I will stay in touch with my friends, family members, and my primary care provider/team.    Use your imagination  Be creative.  We all have a creative side; it doesn t matter if it s oil painting, sand castles, or mud pies! This will also kick up the endorphins.    Witness Beauty  (AKA stop and smell the roses) Take a look outside, even in mid-winter. Notice colors, textures. Watch the squirrels and birds.     Service to others  Be of service to others.  There is always someone else in need.  By helping others we can  get out of ourselves  and remember the really important things.  This also provides opportunities for practicing all the other parts of the program.    Humor  Laugh and be silly!  Adjust your TV habits for less news and crime-drama and more comedy.    Control your stress  Try breathing deep, massage therapy, biofeedback, and meditation. Find time to relax each day.     My support system    Clinic Contact:  Phone number:    Contact 1:  Phone number:    Contact 2:  Phone number:    Sabianism/:  Phone number:    Therapist:  Phone number:    Local crisis center:    Phone number:    Other community support:  Phone number:

## 2017-11-13 NOTE — PROGRESS NOTES
SUBJECTIVE:   Toño Gutierrez is a 42 year old male who presents to clinic today for the following health issues:      Depression and Anxiety Follow-Up    Status since last visit: Improved     Other associated symptoms:None    Complicating factors:     Significant life event: No     Current substance abuse: None        PHQ-9 Score and MyChart F/U Questions 10/4/2016 11/4/2016 4/24/2017   Total Score 4 7 2   Q9: Suicide Ideation Several days Several days Not at all     AMRIT-7 SCORE 10/4/2016 11/4/2016   Total Score 9 3     In the past two weeks have you had thoughts of suicide or self-harm?  No.    Do you have concerns about your personal safety or the safety of others?   No    PHQ-9  English  PHQ-9   Any Language  GAD7  Suicide Assessment Five-step Evaluation and Treatment (SAFE-T)      Amount of exercise or physical activity: None    Problems taking medications regularly: No    Medication side effects: none    Diet: regular (no restrictions)        Migraine Follow-Up    Headaches symptoms:  Stable     Frequency: 2-3 annually     Duration of headaches: 4-6 hrs    Able to do normal daily activities/work with migraines: No -     Rescue/Relief medication:sumatriptan (Imitrex)              Effectiveness: total relief    Preventative medication: None    Neurologic complications: No new stroke-like symptoms, loss of vision or speech, numbness or weakness    In the past 4 weeks, how often have you gone to Urgent Care or the emergency room because of your headaches?  0          Problem list and histories reviewed & adjusted, as indicated.  Additional history: as documented    Patient Active Problem List   Diagnosis     Major depression, recurrent (H)     Anxiety     ED (erectile dysfunction)     Intractable chronic migraine without aura     Social anxiety disorder     ACP (advance care planning)     Past Surgical History:   Procedure Laterality Date     CIRCUMCISION       ORTHOPEDIC SURGERY  2003    rt aurelio teixeiraed  "and pinned     ring finger open reduction internal fixation      right       Social History   Substance Use Topics     Smoking status: Former Smoker     Packs/day: 0.50     Types: Cigarettes     Smokeless tobacco: Former User     Quit date: 2/13/2014     Alcohol use Yes     Family History   Problem Relation Age of Onset     Hypertension Maternal Grandmother      Crohn Disease Sister          Current Outpatient Prescriptions   Medication Sig Dispense Refill     ARIPiprazole (ABILIFY) 5 MG tablet Take 1 tablet (5 mg) by mouth At Bedtime 30 tablet 1     clonazePAM (KLONOPIN) 1 MG tablet Take 1 tablet (1 mg) by mouth 3 times daily as needed for anxiety 90 tablet 5     SUMAtriptan (IMITREX) 50 MG tablet Take 1 tablet (50 mg) by mouth at onset of headache for migraine TAKE 1 AT ONSET OF HEADACHE, MAY REPEAT AFTER 2 HR, NOT TO EXEED 200MG DAILY 30 tablet 3     venlafaxine (EFFEXOR) 75 MG tablet Take 1 tablet (75 mg) by mouth 3 times daily 90 tablet 5     Allergies   Allergen Reactions     Erythromycin Base [Kdc:Yellow Dye+Erythromycin+Brilliant Blue Fcf]      Recent Labs   Lab Test  04/04/16   1058  12/09/14   0934   TSH  0.65  1.02      BP Readings from Last 3 Encounters:   11/13/17 (!) 138/102   04/24/17 120/71   11/04/16 (!) 128/100    Wt Readings from Last 3 Encounters:   11/13/17 265 lb (120.2 kg)   04/24/17 262 lb (118.8 kg)   11/04/16 244 lb (110.7 kg)                  Labs reviewed in EPIC          [facility]       Reviewed and updated as needed this visit by Provider         ROS:  Constitutional, HEENT, cardiovascular, pulmonary, gi and gu systems are negative, except as otherwise noted.      OBJECTIVE:   BP (!) 138/102 (BP Location: Left arm, Patient Position: Sitting, Cuff Size: Adult Large)  Pulse 80  Resp 14  Ht 5' 11\" (1.803 m)  Wt 265 lb (120.2 kg)  BMI 36.96 kg/m2  Body mass index is 36.96 kg/(m^2).     GENERAL: healthy, alert and no " distress  EYES: Eyes grossly normal to inspection, PERRL and conjunctivae and sclerae normal  HENT: ear canals and TM's normal, nose and mouth without ulcers or lesions  NECK: no adenopathy, no asymmetry, masses, or scars and thyroid normal to palpation  RESP: lungs clear to auscultation - no rales, rhonchi or wheezes  CV: regular rate and rhythm, normal S1 S2, no S3 or S4, no murmur, click or rub, no peripheral edema and peripheral pulses strong  MS: no gross musculoskeletal defects noted, no edema  SKIN: no suspicious lesions or rashes  PSYCH: mentation appears normal, affect normal/bright        ASSESSMENT/PLAN:     Hypertension; controlled   Associated with the following complications:    None   Plan:  No changes in the patient's current treatment plan    Depression; recurrent episode-- Moderate   Associated with the following complications:    None   Plan:  No changes in the patient's current treatment plan    Migraine: controlled   Plan:  No changes in the patient's current treatment plan  Migraine Packet given          1. Need for prophylactic vaccination and inoculation against influenza  - FLU VAC, SPLIT VIRUS IM > 3 YO (QUADRIVALENT) [35178]  - Vaccine Administration, Initial [94937]    2. Social anxiety disorder  - clonazePAM (KLONOPIN) 1 MG tablet; Take 1 tablet (1 mg) by mouth 3 times daily as needed for anxiety  Dispense: 90 tablet; Refill: 5  - venlafaxine (EFFEXOR) 75 MG tablet; Take 1 tablet (75 mg) by mouth 3 times daily  Dispense: 90 tablet; Refill: 5    3. Moderate episode of recurrent major depressive disorder (H)  - venlafaxine (EFFEXOR) 75 MG tablet; Take 1 tablet (75 mg) by mouth 3 times daily  Dispense: 90 tablet; Refill: 5    4. Anxiety  - clonazePAM (KLONOPIN) 1 MG tablet; Take 1 tablet (1 mg) by mouth 3 times daily as needed for anxiety  Dispense: 90 tablet; Refill: 5  - venlafaxine (EFFEXOR) 75 MG tablet; Take 1 tablet (75 mg) by mouth 3 times daily  Dispense: 90 tablet; Refill: 5    5.  Benign essential hypertension - goal is 120's/70's  - lisinopril (PRINIVIL/ZESTRIL) 5 MG tablet; Take 1 tablet (5 mg) by mouth daily  Dispense: 30 tablet; Refill: 1  - TSH with free T4 reflex  - Basic metabolic panel  - Call my nurse Alyssa with a BP reading in 1 week and we will go from there.  - Baby aspirin - 81mg EC daily          Yelitza Roche NP  [facility]3] [place]        Injectable Influenza Immunization Documentation    1.  Is the person to be vaccinated sick today?   No    2. Does the person to be vaccinated have an allergy to a component   of the vaccine?   No  Egg Allergy Algorithm Link    3. Has the person to be vaccinated ever had a serious reaction   to influenza vaccine in the past?   No    4. Has the person to be vaccinated ever had Guillain-Barré syndrome?   No    Form completed by Lesli Jeong

## 2017-11-13 NOTE — PATIENT INSTRUCTIONS
1. Need for prophylactic vaccination and inoculation against influenza  - FLU VAC, SPLIT VIRUS IM > 3 YO (QUADRIVALENT) [69824]  - Vaccine Administration, Initial [87395]    2. Social anxiety disorder  - clonazePAM (KLONOPIN) 1 MG tablet; Take 1 tablet (1 mg) by mouth 3 times daily as needed for anxiety  Dispense: 90 tablet; Refill: 5  - venlafaxine (EFFEXOR) 75 MG tablet; Take 1 tablet (75 mg) by mouth 3 times daily  Dispense: 90 tablet; Refill: 5    3. Moderate episode of recurrent major depressive disorder (H)  - venlafaxine (EFFEXOR) 75 MG tablet; Take 1 tablet (75 mg) by mouth 3 times daily  Dispense: 90 tablet; Refill: 5    4. Anxiety  - clonazePAM (KLONOPIN) 1 MG tablet; Take 1 tablet (1 mg) by mouth 3 times daily as needed for anxiety  Dispense: 90 tablet; Refill: 5  - venlafaxine (EFFEXOR) 75 MG tablet; Take 1 tablet (75 mg) by mouth 3 times daily  Dispense: 90 tablet; Refill: 5    5. Benign essential hypertension - goal is 120's/70's  - lisinopril (PRINIVIL/ZESTRIL) 5 MG tablet; Take 1 tablet (5 mg) by mouth daily  Dispense: 30 tablet; Refill: 1  - TSH with free T4 reflex  - Basic metabolic panel  - Call my nurse Alyssa with a BP reading in 1 week and we will go from there.  - Baby aspirin - 81mg EC daily          Yelitza Roche NP  [facility]

## 2017-11-14 LAB
ANION GAP SERPL CALCULATED.3IONS-SCNC: 11 MMOL/L (ref 3–14)
BUN SERPL-MCNC: 13 MG/DL (ref 7–30)
CALCIUM SERPL-MCNC: 9.4 MG/DL (ref 8.5–10.1)
CHLORIDE SERPL-SCNC: 101 MMOL/L (ref 94–109)
CO2 SERPL-SCNC: 24 MMOL/L (ref 20–32)
CREAT SERPL-MCNC: 0.75 MG/DL (ref 0.66–1.25)
GFR SERPL CREATININE-BSD FRML MDRD: >90 ML/MIN/1.7M2
GLUCOSE SERPL-MCNC: 94 MG/DL (ref 70–99)
POTASSIUM SERPL-SCNC: 3.6 MMOL/L (ref 3.4–5.3)
SODIUM SERPL-SCNC: 136 MMOL/L (ref 133–144)
TSH SERPL DL<=0.005 MIU/L-ACNC: 2.5 MU/L (ref 0.4–4)

## 2017-11-14 ASSESSMENT — ANXIETY QUESTIONNAIRES: GAD7 TOTAL SCORE: 4

## 2018-01-08 ENCOUNTER — TELEPHONE (OUTPATIENT)
Dept: FAMILY MEDICINE | Facility: OTHER | Age: 43
End: 2018-01-08

## 2018-01-08 DIAGNOSIS — I10 BENIGN ESSENTIAL HYPERTENSION: ICD-10-CM

## 2018-01-08 RX ORDER — LISINOPRIL 5 MG/1
TABLET ORAL
Qty: 60 TABLET | Refills: 1 | Status: SHIPPED | OUTPATIENT
Start: 2018-01-08 | End: 2018-03-20

## 2018-01-08 NOTE — TELEPHONE ENCOUNTER
Pt calls, was started on Lisinopril 5 mg middle of Nov.  Readings 130's/90, no change.  Needs refill now also to [facility]

## 2018-03-15 ENCOUNTER — TELEPHONE (OUTPATIENT)
Dept: FAMILY MEDICINE | Facility: OTHER | Age: 43
End: 2018-03-15

## 2018-03-15 NOTE — TELEPHONE ENCOUNTER
Pt calls, was started on Lisinopril , was at 5mg daily and then increased to 10mg.  Reports blood pressures running 130's/80's.  Needs refills, not scheduled to follow-up till April, per pt, no appt scheduled at this time.  Left message suggesting maybe he should follow-up next week and get his medication adjusted.  Waiting for call back.      Pt calls back, made appt for next Tues with Yelitza, 1 week of Lisinopril called to [facility]

## 2018-03-20 ENCOUNTER — OFFICE VISIT (OUTPATIENT)
Dept: FAMILY MEDICINE | Facility: OTHER | Age: 43
End: 2018-03-20
Attending: NURSE PRACTITIONER
Payer: COMMERCIAL

## 2018-03-20 VITALS
HEART RATE: 94 BPM | DIASTOLIC BLOOD PRESSURE: 88 MMHG | RESPIRATION RATE: 16 BRPM | SYSTOLIC BLOOD PRESSURE: 138 MMHG | OXYGEN SATURATION: 99 % | TEMPERATURE: 98.2 F | HEIGHT: 71 IN

## 2018-03-20 DIAGNOSIS — R07.0 THROAT PAIN: ICD-10-CM

## 2018-03-20 DIAGNOSIS — F40.10 SOCIAL ANXIETY DISORDER: ICD-10-CM

## 2018-03-20 DIAGNOSIS — I10 BENIGN ESSENTIAL HYPERTENSION: Primary | ICD-10-CM

## 2018-03-20 DIAGNOSIS — G43.711 INTRACTABLE CHRONIC MIGRAINE WITHOUT AURA AND WITH STATUS MIGRAINOSUS: ICD-10-CM

## 2018-03-20 DIAGNOSIS — F41.9 ANXIETY: ICD-10-CM

## 2018-03-20 DIAGNOSIS — E55.9 VITAMIN D DEFICIENCY: ICD-10-CM

## 2018-03-20 DIAGNOSIS — E78.5 HYPERLIPIDEMIA LDL GOAL <100: ICD-10-CM

## 2018-03-20 DIAGNOSIS — F33.1 MODERATE EPISODE OF RECURRENT MAJOR DEPRESSIVE DISORDER (H): ICD-10-CM

## 2018-03-20 LAB
ANION GAP SERPL CALCULATED.3IONS-SCNC: 9 MMOL/L (ref 3–14)
BUN SERPL-MCNC: 11 MG/DL (ref 7–30)
CALCIUM SERPL-MCNC: 9.4 MG/DL (ref 8.5–10.1)
CHLORIDE SERPL-SCNC: 100 MMOL/L (ref 94–109)
CHOLEST SERPL-MCNC: 383 MG/DL
CO2 SERPL-SCNC: 27 MMOL/L (ref 20–32)
CREAT SERPL-MCNC: 0.85 MG/DL (ref 0.66–1.25)
GFR SERPL CREATININE-BSD FRML MDRD: >90 ML/MIN/1.7M2
GLUCOSE SERPL-MCNC: 105 MG/DL (ref 70–99)
HDLC SERPL-MCNC: 50 MG/DL
LDLC SERPL CALC-MCNC: 296 MG/DL
NONHDLC SERPL-MCNC: 333 MG/DL
POTASSIUM SERPL-SCNC: 4.2 MMOL/L (ref 3.4–5.3)
SODIUM SERPL-SCNC: 136 MMOL/L (ref 133–144)
TRIGL SERPL-MCNC: 186 MG/DL
TSH SERPL DL<=0.005 MIU/L-ACNC: 1.33 MU/L (ref 0.4–4)

## 2018-03-20 PROCEDURE — 80061 LIPID PANEL: CPT | Mod: ZL | Performed by: NURSE PRACTITIONER

## 2018-03-20 PROCEDURE — 99215 OFFICE O/P EST HI 40 MIN: CPT | Performed by: NURSE PRACTITIONER

## 2018-03-20 PROCEDURE — 80048 BASIC METABOLIC PNL TOTAL CA: CPT | Mod: ZL | Performed by: NURSE PRACTITIONER

## 2018-03-20 PROCEDURE — 99000 SPECIMEN HANDLING OFFICE-LAB: CPT | Performed by: NURSE PRACTITIONER

## 2018-03-20 PROCEDURE — 36415 COLL VENOUS BLD VENIPUNCTURE: CPT | Mod: ZL | Performed by: NURSE PRACTITIONER

## 2018-03-20 PROCEDURE — 84443 ASSAY THYROID STIM HORMONE: CPT | Mod: ZL | Performed by: NURSE PRACTITIONER

## 2018-03-20 PROCEDURE — 82306 VITAMIN D 25 HYDROXY: CPT | Mod: ZL | Performed by: NURSE PRACTITIONER

## 2018-03-20 PROCEDURE — G0463 HOSPITAL OUTPT CLINIC VISIT: HCPCS

## 2018-03-20 RX ORDER — AZITHROMYCIN 250 MG/1
TABLET, FILM COATED ORAL
Qty: 6 TABLET | Refills: 0 | Status: SHIPPED | OUTPATIENT
Start: 2018-03-20 | End: 2018-03-20

## 2018-03-20 RX ORDER — LISINOPRIL 20 MG/1
20 TABLET ORAL DAILY
Qty: 90 TABLET | Refills: 1 | Status: SHIPPED | OUTPATIENT
Start: 2018-03-20 | End: 2018-05-03

## 2018-03-20 RX ORDER — AZITHROMYCIN 250 MG/1
TABLET, FILM COATED ORAL
Qty: 11 TABLET | Refills: 0 | Status: SHIPPED | OUTPATIENT
Start: 2018-03-20 | End: 2018-03-31

## 2018-03-20 RX ORDER — VENLAFAXINE 75 MG/1
75 TABLET ORAL 3 TIMES DAILY
Qty: 90 TABLET | Refills: 5 | Status: SHIPPED | OUTPATIENT
Start: 2018-03-20 | End: 2018-05-03

## 2018-03-20 RX ORDER — CLONAZEPAM 1 MG/1
1 TABLET ORAL 3 TIMES DAILY PRN
Qty: 90 TABLET | Refills: 5 | Status: SHIPPED | OUTPATIENT
Start: 2018-03-20 | End: 2018-05-03

## 2018-03-20 ASSESSMENT — ANXIETY QUESTIONNAIRES
6. BECOMING EASILY ANNOYED OR IRRITABLE: NOT AT ALL
5. BEING SO RESTLESS THAT IT IS HARD TO SIT STILL: NOT AT ALL
GAD7 TOTAL SCORE: 2
2. NOT BEING ABLE TO STOP OR CONTROL WORRYING: NOT AT ALL
4. TROUBLE RELAXING: NOT AT ALL
1. FEELING NERVOUS, ANXIOUS, OR ON EDGE: SEVERAL DAYS
3. WORRYING TOO MUCH ABOUT DIFFERENT THINGS: NOT AT ALL
7. FEELING AFRAID AS IF SOMETHING AWFUL MIGHT HAPPEN: SEVERAL DAYS

## 2018-03-20 ASSESSMENT — PAIN SCALES - GENERAL: PAINLEVEL: NO PAIN (0)

## 2018-03-20 NOTE — NURSING NOTE
"Chief Complaint   Patient presents with     Recheck Medication     HTN and anxiety;  patient is fasting per Luz VARGAS       Initial /88 (BP Location: Left arm, Patient Position: Sitting, Cuff Size: Adult Large)  Pulse 94  Temp 98.2  F (36.8  C) (Tympanic)  Resp 16  Ht 5' 11\" (1.803 m)  SpO2 99% Estimated body mass index is 36.96 kg/(m^2) as calculated from the following:    Height as of 11/13/17: 5' 11\" (1.803 m).    Weight as of 11/13/17: 265 lb (120.2 kg).  Medication Reconciliation: complete     Rosemary Covington      "

## 2018-03-20 NOTE — MR AVS SNAPSHOT
After Visit Summary   3/20/2018    Toño Gutierrez    MRN: [id number]           Patient Information     Date Of Birth          1975        Visit Information        Provider Department      3/20/2018 9:45 AM Yelitza Roche NP [facility]        Today's Diagnoses     Benign essential hypertension    -  1    Intractable chronic migraine without aura and with status migrainosus        Social anxiety disorder        Moderate episode of recurrent major depressive disorder (H)        Anxiety        Vitamin D deficiency        Throat pain          Care Instructions        1. Benign essential hypertension - Goal is 120's/70's  - lisinopril (PRINIVIL/ZESTRIL) 20 MG tablet; Take 1 tablet (20 mg) by mouth daily  Dispense: 90 tablet; Refill: 1  - TSH with free T4 reflex  - Lipid Profile  - Basic metabolic panel  - BP recheck with my nurse in 1-2 weeks    2. Intractable chronic migraine without aura and with status migrainosus  - Continue plan of care    3. Social anxiety disorder  - venlafaxine (EFFEXOR) 75 MG tablet; Take 1 tablet (75 mg) by mouth 3 times daily  Dispense: 90 tablet; Refill: 5  - clonazePAM (KLONOPIN) 1 MG tablet; Take 1 tablet (1 mg) by mouth 3 times daily as needed for anxiety  Dispense: 90 tablet; Refill: 5  - TSH with free T4 reflex    4. Moderate episode of recurrent major depressive disorder (H)  - venlafaxine (EFFEXOR) 75 MG tablet; Take 1 tablet (75 mg) by mouth 3 times daily  Dispense: 90 tablet; Refill: 5  - clonazePAM (KLONOPIN) 1 MG tablet; Take 1 tablet (1 mg) by mouth 3 times daily as needed for anxiety  Dispense: 90 tablet; Refill: 5  - TSH with free T4 reflex    5. Anxiety  -Continue plan of care    6. Vitamin D deficiency  - Vitamin D level  - cholecalciferol (VITAMIN D3) 5000 UNITS TABS tablet; Take 1 tablet (5,000 Units) by mouth daily  Dispense: 90 tablet; Refill: 11    7. Throat pain  - azithromycin (ZITHROMAX) 250 MG tablet; Two tablets first day, then one  "tablet daily for 9 days.  Dispense: 11  tablet; Refill: 0        Fluids  Rest  Humidity at home - add bacteriostatic solution to humidifier  OTC Mucinex liquid cough and cold  Add Zicam or other zinc daily until you feel better  Please go to the ER or UC if your symptoms worsen   Please return to clinic if unimproved          Yelitza Roche NP  [facility]              Follow-ups after your visit        Who to contact     If you have questions or need follow up information about today's clinic visit or your schedule please contact [facility] directly at [phone].  Normal or non-critical lab and imaging results will be communicated to you by [employer], letter or phone within 4 business days after the clinic has received the results. If you do not hear from us within 7 days, please contact the clinic through [employer] or phone. If you have a critical or abnormal lab result, we will notify you by phone as soon as possible.  Submit refill requests through [employer] or call your pharmacy and they will forward the refill request to us. Please allow 3 business days for your refill to be completed.          Additional Information About Your Visit        MyChart Information     User Replay lets you send messages to your doctor, view your test results, renew your prescriptions, schedule appointments and more. To sign up, go to [email or web address] . Click on \"Log in\" on the left side of the screen, which will take you to the Welcome page. Then click on \"Sign up Now\" on the right side of the page.     You will be asked to enter the access code listed below, as well as some personal information. Please follow the directions to create your username and password.     Your access code is: [email or web address]  Expires: 2018 10:39 AM     Your access code will  in 90 days. If you need help or a new code, please call your [facility] or [phone].        Care EveryWhere ID     This is your Care " "EveryWhere ID. This could be used by other organizations to access your [facility] medical records  [id number]        Your Vitals Were     Pulse Temperature Respirations Height Pulse Oximetry       94 98.2  F (36.8  C) (Tympanic) 16 5' 11\" (1.803 m) 99%        Blood Pressure from Last 3 Encounters:   03/20/18 138/88   11/13/17 (!) 138/102   04/24/17 120/71    Weight from Last 3 Encounters:   11/13/17 265 lb (120.2 kg)   04/24/17 262 lb (118.8 kg)   11/04/16 244 lb (110.7 kg)              We Performed the Following     Basic metabolic panel     Lipid Profile     TSH with free T4 reflex     Vitamin D Deficiency          Today's Medication Changes          These changes are accurate as of 3/20/18 10:39 AM.  If you have any questions, ask your nurse or doctor.               Start taking these medicines.        Dose/Directions    azithromycin 250 MG tablet   Commonly known as:  ZITHROMAX   Used for:  Throat pain   Started by:  Yelitza Roche NP        Two tablets first day, then one tablet daily for 9 days.   Quantity:  11 tablet   Refills:  0       cholecalciferol 5000 UNITS Tabs tablet   Commonly known as:  vitamin D3   Used for:  Vitamin D deficiency   Started by:  Yelitza Roche NP        Dose:  5000 Units   Take 1 tablet (5,000 Units) by mouth daily   Quantity:  90 tablet   Refills:  11         These medicines have changed or have updated prescriptions.        Dose/Directions    lisinopril 20 MG tablet   Commonly known as:  PRINIVIL/ZESTRIL   This may have changed:    - medication strength  - how much to take  - how to take this  - when to take this  - additional instructions   Used for:  Benign essential hypertension   Changed by:  Yelitza Roche NP        Dose:  20 mg   Take 1 tablet (20 mg) by mouth daily   Quantity:  90 tablet   Refills:  1            Where to get your medicines      These medications were sent to [facility] - [address] [facility] [address]  [address] [place] [address] " [address] [place] [address]     Phone:  [phone]     azithromycin 250 MG tablet    lisinopril 20 MG tablet    venlafaxine 75 MG tablet         Some of these will need a paper prescription and others can be bought over the counter.  Ask your nurse if you have questions.     Bring a paper prescription for each of these medications     clonazePAM 1 MG tablet       You don't need a prescription for these medications     cholecalciferol 5000 UNITS Tabs tablet                Primary Care Provider Office Phone # Fax #    Yelitza AAKASH Roche [phone] [address]  [place] [address]        Equal Access to Services     [facility]: Hadii aad ku hadasho Soomaali, waaxda luqadaha, qaybta kaalmada adeegyada, lin hicks . So [facility] [phone].    ATENCIÓN: Si habla español, tiene a juarez disposición servicios gratuitos de asistencia lingüística. Ll[facility] [phone].    We comply with applicable federal civil rights laws and [place] laws. We do not discriminate on the basis of race, color, national origin, age, disability, sex, sexual orientation, or gender identity.            Thank you!     Thank you for choosing [facility]  for your care. Our goal is always to provide you with excellent care. Hearing back from our patients is one way we can continue to improve our services. Please take a few minutes to complete the written survey that you may receive in the mail after your visit with us. Thank you!             Your Updated Medication List - Protect others around you: Learn how to safely use, store and throw away your medicines at www.disposemymeds.org.          This list is accurate as of 3/20/18 10:39 AM.  Always use your most recent med list.                   Brand Name Dispense Instructions for use Diagnosis    aspirin 81 MG EC tablet     90 tablet    Take 1 tablet (81 mg) by mouth daily    Benign essential hypertension        azithromycin 250 MG tablet    ZITHROMAX    11 tablet    Two tablets first day, then one tablet daily for 9 days.    Throat pain       cholecalciferol 5000 UNITS Tabs tablet    vitamin D3    90 tablet    Take 1 tablet (5,000 Units) by mouth daily    Vitamin D deficiency       clonazePAM 1 MG tablet    klonoPIN    90 tablet    Take 1 tablet (1 mg) by mouth 3 times daily as needed for anxiety    Social anxiety disorder, Moderate episode of recurrent major depressive disorder (H)       lisinopril 20 MG tablet    PRINIVIL/ZESTRIL    90 tablet    Take 1 tablet (20 mg) by mouth daily    Benign essential hypertension       SUMAtriptan 50 MG tablet    IMITREX    30 tablet    Take 1 tablet (50 mg) by mouth at onset of headache for migraine TAKE 1 AT ONSET OF HEADACHE, MAY REPEAT AFTER 2 HR, NOT TO EXEED 200MG DAILY    Intractable chronic migraine without aura and without status migrainosus       venlafaxine 75 MG tablet    EFFEXOR    90 tablet    Take 1 tablet (75 mg) by mouth 3 times daily    Social anxiety disorder, Moderate episode of recurrent major depressive disorder (H)

## 2018-03-20 NOTE — PATIENT INSTRUCTIONS
1. Benign essential hypertension - Goal is 120's/70's  - lisinopril (PRINIVIL/ZESTRIL) 20 MG tablet; Take 1 tablet (20 mg) by mouth daily  Dispense: 90 tablet; Refill: 1  - TSH with free T4 reflex  - Lipid Profile  - Basic metabolic panel  - BP recheck with my nurse in 1-2 weeks    2. Intractable chronic migraine without aura and with status migrainosus  - Continue plan of care    3. Social anxiety disorder  - venlafaxine (EFFEXOR) 75 MG tablet; Take 1 tablet (75 mg) by mouth 3 times daily  Dispense: 90 tablet; Refill: 5  - clonazePAM (KLONOPIN) 1 MG tablet; Take 1 tablet (1 mg) by mouth 3 times daily as needed for anxiety  Dispense: 90 tablet; Refill: 5  - TSH with free T4 reflex    4. Moderate episode of recurrent major depressive disorder (H)  - venlafaxine (EFFEXOR) 75 MG tablet; Take 1 tablet (75 mg) by mouth 3 times daily  Dispense: 90 tablet; Refill: 5  - clonazePAM (KLONOPIN) 1 MG tablet; Take 1 tablet (1 mg) by mouth 3 times daily as needed for anxiety  Dispense: 90 tablet; Refill: 5  - TSH with free T4 reflex    5. Anxiety  -Continue plan of care    6. Vitamin D deficiency  - Vitamin D level  - cholecalciferol (VITAMIN D3) 5000 UNITS TABS tablet; Take 1 tablet (5,000 Units) by mouth daily  Dispense: 90 tablet; Refill: 11    7. Throat pain  - azithromycin (ZITHROMAX) 250 MG tablet; Two tablets first day, then one tablet daily for 9 days.  Dispense: 11  tablet; Refill: 0        Fluids  Rest  Humidity at home - add bacteriostatic solution to humidifier  OTC Mucinex liquid cough and cold  Add Zicam or other zinc daily until you feel better  Please go to the ER or UC if your symptoms worsen   Please return to clinic if unimproved          Yelitza Roche NP  [facility] ([facility])

## 2018-03-20 NOTE — PROGRESS NOTES
SUBJECTIVE:                                                    Toño Gutierrez is a 42 year old male who presents to clinic today for the following health issues:        Acute Illness   Acute illness concerns: sore throat, cough, nasal congestion - 4 days  Onset: 4 days or more    Fever: YES    Chills/Sweats: YES    Headache (location?): YES    Sinus Pressure:YES    Conjunctivitis:  no    Ear Pain: no    Rhinorrhea: YES    Congestion: YES    Sore Throat: YES     Cough: YES    Wheeze: no    Decreased Appetite: no    Nausea: no    Vomiting: no    Diarrhea:  no    Dysuria/Freq.: no    Fatigue/Achiness: YES    Sick/Strep Exposure: no     Therapies Tried and outcome: OTC nasal spray       Hypertension Follow-up    Outpatient blood pressures are being checked at home.  Results are 130/140.    Low Salt Diet: reg diet      Migraine Follow-Up    Headaches symptoms:  Stable     Frequency: variable     Duration of headaches: variable    Able to do normal daily activities/work with migraines: Yes    Rescue/Relief medication:see chart              Effectiveness: total relief    Neurologic complications: No new stroke-like symptoms, loss of vision or speech, numbness or weakness    In the past 4 weeks, how often have you gone to Urgent Care or the emergency room because of your headaches?  0      Depression and Anxiety Follow-Up    Status since last visit: No change    Other associated symptoms:None    Complicating factors:     Significant life event: No     Current substance abuse: None      Some increased symptoms due to loss of his grandfather - he passed away this morning. His death was expected, but nonetheless has made his mood lower.       PHQ-9 4/24/2017 11/13/2017 3/20/2018   Total Score 2 1 1   Q9: Suicide Ideation Not at all Not at all Not at all     AMRIT-7 SCORE 11/4/2016 11/13/2017 3/20/2018   Total Score 3 4 2     In the past two weeks have you had thoughts of suicide or self-harm?  No.    Do you have concerns  about your personal safety or the safety of others?   No         PHQ-9  English  PHQ-9   Any Language  AMRIT-7  Suicide Assessment Five-step Evaluation and Treatment (SAFE-T)          Amount of exercise or physical activity: 4-5 days/week for an average of 45-60 minutes    Problems taking medications regularly: No    Medication side effects: none    Diet: regular (no restrictions)      Problem list and histories reviewed & adjusted, as indicated.  Additional history: as documented    Patient Active Problem List   Diagnosis     Major depression, recurrent (H)     ED (erectile dysfunction)     Intractable chronic migraine without aura     Social anxiety disorder     ACP (advance care planning)     Benign essential hypertension     Past Surgical History:   Procedure Laterality Date     CIRCUMCISION       ORTHOPEDIC SURGERY  2003    rt ringfinger, fx'ed and pinned     ring finger open reduction internal fixation      right       Social History   Substance Use Topics     Smoking status: Former Smoker     Packs/day: 0.50     Types: Cigarettes     Smokeless tobacco: Former User     Quit date: 2/13/2014     Alcohol use Yes     Family History   Problem Relation Age of Onset     Hypertension Maternal Grandmother      Crohn Disease Sister          Current Outpatient Prescriptions   Medication Sig Dispense Refill     lisinopril (PRINIVIL/ZESTRIL) 5 MG tablet 2 po qd for 10mg 60 tablet 1     clonazePAM (KLONOPIN) 1 MG tablet Take 1 tablet (1 mg) by mouth 3 times daily as needed for anxiety 90 tablet 5     venlafaxine (EFFEXOR) 75 MG tablet Take 1 tablet (75 mg) by mouth 3 times daily 90 tablet 5     aspirin 81 MG EC tablet Take 1 tablet (81 mg) by mouth daily 90 tablet 11     SUMAtriptan (IMITREX) 50 MG tablet Take 1 tablet (50 mg) by mouth at onset of headache for migraine TAKE 1 AT ONSET OF HEADACHE, MAY REPEAT AFTER 2 HR, NOT TO EXEED 200MG DAILY 30 tablet 3     Allergies   Allergen Reactions     Erythromycin Base [Kdc:Yellow  "Dye+Erythromycin+Brilliant Blue Fcf]      Recent Labs   Lab Test  11/13/17   1547  04/04/16   1058   CR  0.75   --    GFRESTIMATED  >90   --    GFRESTBLACK  >90   --    POTASSIUM  3.6   --    TSH  2.50  0.65      BP Readings from Last 3 Encounters:   03/20/18 138/88   11/13/17 (!) 138/102   04/24/17 120/71    Wt Readings from Last 3 Encounters:   11/13/17 265 lb (120.2 kg)   04/24/17 262 lb (118.8 kg)   11/04/16 244 lb (110.7 kg)               Labs reviewed in EPIC    ROS:  CONSTITUTIONAL:chills  INTEGUMENTARY/SKIN: NEGATIVE for worrisome rashes, moles or lesions  EYES: NEGATIVE for vision changes or irritation  ENT/MOUTH: as above  RESP:as above  CV: NEGATIVE for chest pain, palpitations or peripheral edema  GI: NEGATIVE for nausea, abdominal pain, heartburn, or change in bowel habits  : NEGATIVE for frequency, dysuria, or hematuria  MUSCULOSKELETAL: NEGATIVE for significant arthralgias or myalgia  NEURO: NEGATIVE for weakness, dizziness or paresthesias  ENDOCRINE: NEGATIVE for temperature intolerance, skin/hair changes  HEME: NEGATIVE for bleeding problems  PSYCHIATRIC: as above    OBJECTIVE:     /88 (BP Location: Left arm, Patient Position: Sitting, Cuff Size: Adult Large)  Pulse 94  Temp 98.2  F (36.8  C) (Tympanic)  Resp 16  Ht 5' 11\" (1.803 m)  SpO2 99%  There is no height or weight on file to calculate BMI.       GENERAL: healthy, alert and no distress  EYES: Eyes grossly normal to inspection, PERRL and conjunctivae and sclerae normal  HENT: rhinorrhea yellow, tonsillar erythema, tonsillar exudate and yellow PND  NECK: no adenopathy, no asymmetry, masses, or scars and thyroid normal to palpation  RESP: lungs clear to auscultation - no rales, rhonchi or wheezes  CV: regular rate and rhythm, normal S1 S2, no S3 or S4, no murmur, click or rub, no peripheral edema and peripheral pulses strong  ABDOMEN: soft, nontender, no hepatosplenomegaly, no masses and bowel sounds normal  MS: no gross " musculoskeletal defects noted, no edema  SKIN: no suspicious lesions or rashes  PSYCH: mentation appears normal, affect normal/bright      Visit time:   Over 40  minutes are spent with the patient.   Greater than 50 % of our visit time was spent face to face and included education and counseling regarding current conditions,  medication management, and plan of care.  We discussed the importance of medication compliance.  Visit Content:   Anxiety management, migraine management, BP medication adjustment and FU plan.    Lab testing ordered  Any medication adjustment has been reviewed  Health Maintenance - updated as appropriate.  Record review completed      ASSESSMENT/PLAN:     Hypertension; controlled   Associated with the following complications:    None   Plan:  No changes in the patient's current treatment plan    Depression; recurrent episode-- Moderate   Associated with the following complications:    None   Plan:  No changes in the patient's current treatment plan    Migraine: controlled   Plan:  No changes in the patient's current treatment plan          1. Benign essential hypertension - Goal is 120's/70's  - lisinopril (PRINIVIL/ZESTRIL) 20 MG tablet; Take 1 tablet (20 mg) by mouth daily  Dispense: 90 tablet; Refill: 1  - TSH with free T4 reflex  - Lipid Profile  - Basic metabolic panel  - BP recheck with my nurse in 1-2 weeks    2. Intractable chronic migraine without aura and with status migrainosus  - Continue plan of care    3. Social anxiety disorder  - venlafaxine (EFFEXOR) 75 MG tablet; Take 1 tablet (75 mg) by mouth 3 times daily  Dispense: 90 tablet; Refill: 5  - clonazePAM (KLONOPIN) 1 MG tablet; Take 1 tablet (1 mg) by mouth 3 times daily as needed for anxiety  Dispense: 90 tablet; Refill: 5  - TSH with free T4 reflex    4. Moderate episode of recurrent major depressive disorder (H)  - venlafaxine (EFFEXOR) 75 MG tablet; Take 1 tablet (75 mg) by mouth 3 times daily  Dispense: 90 tablet; Refill:  5  - clonazePAM (KLONOPIN) 1 MG tablet; Take 1 tablet (1 mg) by mouth 3 times daily as needed for anxiety  Dispense: 90 tablet; Refill: 5  - TSH with free T4 reflex    5. Anxiety  -Continue plan of care    6. Vitamin D deficiency  - Vitamin D level  - cholecalciferol (VITAMIN D3) 5000 UNITS TABS tablet; Take 1 tablet (5,000 Units) by mouth daily  Dispense: 90 tablet; Refill: 11    7. Throat pain  - azithromycin (ZITHROMAX) 250 MG tablet; Two tablets first day, then one tablet daily for 9 days.  Dispense: 11  tablet; Refill: 0        Fluids  Rest  Humidity at home - add bacteriostatic solution to humidifier  OTC Mucinex liquid cough and cold  Add Zicam or other zinc daily until you feel better  Please go to the ER or UC if your symptoms worsen   Please return to clinic if unimproved          Yelitza Roche NP  [facility]

## 2018-03-20 NOTE — LETTER
My Depression Action Plan  Name: Toño Gutierrez   Date of Birth 1975  Date: 3/20/2018    My doctor: Yelitza Roche   My clinic: [facility]  [address] [place] [address] [address]  [facility] [address]  [phone]          GREEN    ZONE   Good Control    What it looks like:     Things are going generally well. You have normal up s and down s. You may even feel depressed from time to time, but bad moods usually last less than a day.   What you need to do:  1. Continue to care for yourself (see self care plan)  2. Check your depression survival kit and update it as needed  3. Follow your physician s recommendations including any medication.  4. Do not stop taking medication unless you consult with your physician first.           YELLOW         ZONE Getting Worse    What it looks like:     Depression is starting to interfere with your life.     It may be hard to get out of bed; you may be starting to isolate yourself from others.    Symptoms of depression are starting to last most all day and this has happened for several days.     You may have suicidal thoughts but they are not constant.   What you need to do:     1. Call your care team, your response to treatment will improve if you keep your care team informed of your progress. Yellow periods are signs an adjustment may need to be made.     2. Continue your self-care, even if you have to fake it!    3. Talk to someone in your support network    4. Open up your depression survival kit           RED    ZONE Medical Alert - Get Help    What it looks like:     Depression is seriously interfering with your life.     You may experience these or other symptoms: You can t get out of bed most days, can t work or engage in other necessary activities, you have trouble taking care of basic hygiene, or basic responsibilities, thoughts of suicide or death that will not go away, self-injurious behavior.     What you need to do:  1. Call your care team and  request a same-day appointment. If they are not available (weekends or after hours) call your local crisis line, emergency room or 911.            Depression Self Care Plan / Survival Kit    Self-Care for Depression  Here s the deal. Your body and mind are really not as separate as most people think.  What you do and think affects how you feel and how you feel influences what you do and think. This means if you do things that people who feel good do, it will help you feel better.  Sometimes this is all it takes.  There is also a place for medication and therapy depending on how severe your depression is, so be sure to consult with your medical provider and/ or Behavioral Health Consultant if your symptoms are worsening or not improving.     In order to better manage my stress, I will:    Exercise  Get some form of exercise, every day. This will help reduce pain and release endorphins, the  feel good  chemicals in your brain. This is almost as good as taking antidepressants!  This is not the same as joining a gym and then never going! (they count on that by the way ) It can be as simple as just going for a walk or doing some gardening, anything that will get you moving.      Hygiene   Maintain good hygiene (Get out of bed in the morning, Make your bed, Brush your teeth, Take a shower, and Get dressed like you were going to work, even if you are unemployed).  If your clothes don't fit try to get ones that do.    Diet  I will strive to eat foods that are good for me, drink plenty of water, and avoid excessive sugar, caffeine, alcohol, and other mood-altering substances.  Some foods that are helpful in depression are: complex carbohydrates, B vitamins, flaxseed, fish or fish oil, fresh fruits and vegetables.    Psychotherapy  I agree to participate in Individual Therapy (if recommended).    Medication  If prescribed medications, I agree to take them.  Missing doses can result in serious side effects.  I understand that  drinking alcohol, or other illicit drug use, may cause potential side effects.  I will not stop my medication abruptly without first discussing it with my provider.    Staying Connected With Others  I will stay in touch with my friends, family members, and my primary care provider/team.    Use your imagination  Be creative.  We all have a creative side; it doesn t matter if it s oil painting, sand castles, or mud pies! This will also kick up the endorphins.    Witness Beauty  (AKA stop and smell the roses) Take a look outside, even in mid-winter. Notice colors, textures. Watch the squirrels and birds.     Service to others  Be of service to others.  There is always someone else in need.  By helping others we can  get out of ourselves  and remember the really important things.  This also provides opportunities for practicing all the other parts of the program.    Humor  Laugh and be silly!  Adjust your TV habits for less news and crime-drama and more comedy.    Control your stress  Try breathing deep, massage therapy, biofeedback, and meditation. Find time to relax each day.     My support system    Clinic Contact:  Phone number:    Contact 1:  Phone number:    Contact 2:  Phone number:    Evangelical/:  Phone number:    Therapist:  Phone number:    Local crisis center:    Phone number:    Other community support:  Phone number:

## 2018-03-21 PROBLEM — Z79.899 TAKING A STATIN MEDICATION: Status: ACTIVE | Noted: 2018-03-21

## 2018-03-21 PROBLEM — E78.5 HYPERLIPIDEMIA LDL GOAL <100: Status: ACTIVE | Noted: 2018-03-21

## 2018-03-21 LAB — DEPRECATED CALCIDIOL+CALCIFEROL SERPL-MC: 19 UG/L (ref 20–75)

## 2018-03-21 RX ORDER — SIMVASTATIN 20 MG
20 TABLET ORAL AT BEDTIME
Qty: 90 TABLET | Refills: 1 | Status: SHIPPED | OUTPATIENT
Start: 2018-03-21 | End: 2018-05-03

## 2018-03-21 ASSESSMENT — ANXIETY QUESTIONNAIRES: GAD7 TOTAL SCORE: 2

## 2018-03-21 ASSESSMENT — PATIENT HEALTH QUESTIONNAIRE - PHQ9: SUM OF ALL RESPONSES TO PHQ QUESTIONS 1-9: 1

## 2018-03-21 NOTE — PROGRESS NOTES
BP was high, med's adjusted yesterday  Lipids very high  I sent Zocor to pharmacy  Needs to work on weight and diet  Fish oil 3 per day    Very low D level  High dose D sent to pharmacy - 50,000 U 3 times weekly for 3 mos then DC  Also, D3 5000 U OTC daily and ongoing    Yelitza JENKINS-[facility]  [phone]

## 2018-04-05 ENCOUNTER — ALLIED HEALTH/NURSE VISIT (OUTPATIENT)
Dept: FAMILY MEDICINE | Facility: OTHER | Age: 43
End: 2018-04-05
Attending: NURSE PRACTITIONER
Payer: COMMERCIAL

## 2018-04-05 VITALS — DIASTOLIC BLOOD PRESSURE: 88 MMHG | SYSTOLIC BLOOD PRESSURE: 138 MMHG | HEART RATE: 84 BPM

## 2018-04-05 DIAGNOSIS — I10 BENIGN ESSENTIAL HYPERTENSION: Primary | ICD-10-CM

## 2018-04-05 PROCEDURE — 99207 ZZC NO CHARGE NURSE ONLY: CPT

## 2018-04-05 RX ORDER — METOPROLOL SUCCINATE 25 MG/1
25 TABLET, EXTENDED RELEASE ORAL DAILY
Qty: 30 TABLET | Refills: 1 | Status: SHIPPED | OUTPATIENT
Start: 2018-04-05 | End: 2018-05-03

## 2018-04-05 NOTE — MR AVS SNAPSHOT
"              After Visit Summary   2018    Toño Gutierrez    MRN: [id number]           Patient Information     Date Of Birth          1975        Visit Information        Provider Department      2018 11:00 AM [facility] NURSE [facility]        Today's Diagnoses     Benign essential hypertension    -  1       Follow-ups after your visit        Who to contact     If you have questions or need follow up information about today's clinic visit or your schedule please contact [facility] directly at [phone].  Normal or non-critical lab and imaging results will be communicated to you by [employer], letter or phone within 4 business days after the clinic has received the results. If you do not hear from us within 7 days, please contact the clinic through [employer] or phone. If you have a critical or abnormal lab result, we will notify you by phone as soon as possible.  Submit refill requests through [employer] or call your pharmacy and they will forward the refill request to us. Please allow 3 business days for your refill to be completed.          Additional Information About Your Visit        MyChart Information     [employer] lets you send messages to your doctor, view your test results, renew your prescriptions, schedule appointments and more. To sign up, go to [email or web address] . Click on \"Log in\" on the left side of the screen, which will take you to the Welcome page. Then click on \"Sign up Now\" on the right side of the page.     You will be asked to enter the access code listed below, as well as some personal information. Please follow the directions to create your username and password.     Your access code is: [email or web address]  Expires: 2018 10:39 AM     Your access code will  in 90 days. If you need help or a new code, please call your [facility] or [phone].        Care EveryWhere ID     This is your Care EveryWhere ID. This could be used by other " organizations to access your [facility] medical records  [id number]        Your Vitals Were     Pulse                   84            Blood Pressure from Last 3 Encounters:   04/05/18 138/88   03/20/18 138/88   11/13/17 (!) 138/102    Weight from Last 3 Encounters:   11/13/17 265 lb (120.2 kg)   04/24/17 262 lb (118.8 kg)   11/04/16 244 lb (110.7 kg)              Today, you had the following     No orders found for display       Primary Care Provider Office Phone # Fax #    Yelitza RocheAAKASH [phone] [address]  [place] [address]        Equal Access to Services     [facility] : Hadii barrett Mckay, walaron ramey, qaybsigifredo kaalmada dudley, lin henry. So [facility] [phone].    ATENCIÓN: Si habla español, tiene a juarez disposición servicios gratuitos de asistencia lingüística. Llame al [phone].    We comply with applicable federal civil rights laws and [place] laws. We do not discriminate on the basis of race, color, national origin, age, disability, sex, sexual orientation, or gender identity.            Thank you!     Thank you for choosing [facility]  for your care. Our goal is always to provide you with excellent care. Hearing back from our patients is one way we can continue to improve our services. Please take a few minutes to complete the written survey that you may receive in the mail after your visit with us. Thank you!             Your Updated Medication List - Protect others around you: Learn how to safely use, store and throw away your medicines at www.disposemymeds.org.          This list is accurate as of 4/5/18 11:08 AM.  Always use your most recent med list.                   Brand Name Dispense Instructions for use Diagnosis    aspirin 81 MG EC tablet     90 tablet    Take 1 tablet (81 mg) by mouth daily    Benign essential hypertension       * cholecalciferol 5000 UNITS Tabs tablet    vitamin D3    90  tablet    Take 1 tablet (5,000 Units) by mouth daily    Vitamin D deficiency       * cholecalciferol 77716 UNITS capsule    VITAMIN D3    36 capsule    1 po 3 times weekly for 3 months then DC    Vitamin D deficiency       clonazePAM 1 MG tablet    klonoPIN    90 tablet    Take 1 tablet (1 mg) by mouth 3 times daily as needed for anxiety    Social anxiety disorder, Moderate episode of recurrent major depressive disorder (H)       lisinopril 20 MG tablet    PRINIVIL/ZESTRIL    90 tablet    Take 1 tablet (20 mg) by mouth daily    Benign essential hypertension       simvastatin 20 MG tablet    ZOCOR    90 tablet    Take 1 tablet (20 mg) by mouth At Bedtime    Hyperlipidemia LDL goal <100       SUMAtriptan 50 MG tablet    IMITREX    30 tablet    Take 1 tablet (50 mg) by mouth at onset of headache for migraine TAKE 1 AT ONSET OF HEADACHE, MAY REPEAT AFTER 2 HR, NOT TO EXEED 200MG DAILY    Intractable chronic migraine without aura and without status migrainosus       venlafaxine 75 MG tablet    EFFEXOR    90 tablet    Take 1 tablet (75 mg) by mouth 3 times daily    Social anxiety disorder, Moderate episode of recurrent major depressive disorder (H)       * Notice:  This list has 2 medication(s) that are the same as other medications prescribed for you. Read the directions carefully, and ask your doctor or other care provider to review them with you.

## 2018-04-05 NOTE — Clinical Note
Had a BP check today, BP still high - I sent Toprol XL 25mg to pharmacy.  Take that along with this lisinopril, BP recheck in 2 weeks.  Yelitza KAMINSKI [phone]

## 2018-05-03 ENCOUNTER — ALLIED HEALTH/NURSE VISIT (OUTPATIENT)
Dept: FAMILY MEDICINE | Facility: OTHER | Age: 43
End: 2018-05-03
Attending: NURSE PRACTITIONER
Payer: COMMERCIAL

## 2018-05-03 VITALS — DIASTOLIC BLOOD PRESSURE: 98 MMHG | HEART RATE: 91 BPM | SYSTOLIC BLOOD PRESSURE: 120 MMHG

## 2018-05-03 DIAGNOSIS — I10 BENIGN ESSENTIAL HYPERTENSION: ICD-10-CM

## 2018-05-03 DIAGNOSIS — E78.5 HYPERLIPIDEMIA LDL GOAL <100: ICD-10-CM

## 2018-05-03 DIAGNOSIS — Z01.30 BP CHECK: Primary | ICD-10-CM

## 2018-05-03 DIAGNOSIS — F33.1 MODERATE EPISODE OF RECURRENT MAJOR DEPRESSIVE DISORDER (H): ICD-10-CM

## 2018-05-03 DIAGNOSIS — F40.10 SOCIAL ANXIETY DISORDER: ICD-10-CM

## 2018-05-03 PROCEDURE — 99207 ZZC NO CHARGE NURSE ONLY: CPT

## 2018-05-03 NOTE — MR AVS SNAPSHOT
"              After Visit Summary   5/3/2018    Toño Gutierrez    MRN: [id number]           Patient Information     Date Of Birth          1975        Visit Information        Provider Department      5/3/2018 11:00 AM [facility] NURSE [facility]        Today's Diagnoses     BP check    -  1    Social anxiety disorder        Moderate episode of recurrent major depressive disorder (H)        Benign essential hypertension        Hyperlipidemia LDL goal <100           Follow-ups after your visit        Who to contact     If you have questions or need follow up information about today's clinic visit or your schedule please contact [facility] directly at [phone].  Normal or non-critical lab and imaging results will be communicated to you by AppleTreeBookhart, letter or phone within 4 business days after the clinic has received the results. If you do not hear from us within 7 days, please contact the clinic through AppleTreeBookhart or phone. If you have a critical or abnormal lab result, we will notify you by phone as soon as possible.  Submit refill requests through Cisiv or call your pharmacy and they will forward the refill request to us. Please allow 3 business days for your refill to be completed.          Additional Information About Your Visit        MyChart Information     Cisiv lets you send messages to your doctor, view your test results, renew your prescriptions, schedule appointments and more. To sign up, go to [email or web address] . Click on \"Log in\" on the left side of the screen, which will take you to the Welcome page. Then click on \"Sign up Now\" on the right side of the page.     You will be asked to enter the access code listed below, as well as some personal information. Please follow the directions to create your username and password.     Your access code is: [email or web address]  Expires: 2018 10:39 AM     Your access code will  in 90 days. If you need help or a new code, " please call your [place] clinic or [phone].        Care EveryWhere ID     This is your Care EveryWhere ID. This could be used by other organizations to access your [place] medical records  [id number]        Your Vitals Were     Pulse                   91            Blood Pressure from Last 3 Encounters:   05/03/18 (!) 120/98   04/05/18 138/88   03/20/18 138/88    Weight from Last 3 Encounters:   11/13/17 265 lb (120.2 kg)   04/24/17 262 lb (118.8 kg)   11/04/16 244 lb (110.7 kg)              Today, you had the following     No orders found for display         Today's Medication Changes          These changes are accurate as of 5/3/18 11:59 PM.  If you have any questions, ask your nurse or doctor.               These medicines have changed or have updated prescriptions.        Dose/Directions    metoprolol succinate 25 MG 24 hr tablet   Commonly known as:  TOPROL-XL   This may have changed:    - how much to take  - how to take this  - when to take this  - additional instructions   Used for:  Benign essential hypertension        1 po BID   Quantity:  60 tablet   Refills:  1            Where to get your medicines      These medications were sent to [id number] [place] [address] AT [facility] [address]  [address], [place] [address]     Phone:  [phone]     lisinopril 20 MG tablet    metoprolol succinate 25 MG 24 hr tablet    simvastatin 20 MG tablet    venlafaxine 75 MG tablet         Some of these will need a paper prescription and others can be bought over the counter.  Ask your nurse if you have questions.     Bring a paper prescription for each of these medications     clonazePAM 1 MG tablet                Primary Care Provider Office Phone # Fax #    Yelitza Roche -[phone] [address]  [place] [address]        Equal Access to Services     ANGELY TOM AH: cisco Quach, lorenza  lin porrasalfredo hicks ah. So [facility] [phone].    ATENCIÓN: Si skyla silveira, tiene a juarez disposición servicios gratuitos de asistencia lingüística. Nellie al [phone].    We comply with applicable federal civil rights laws and [place] laws. We do not discriminate on the basis of race, color, national origin, age, disability, sex, sexual orientation, or gender identity.            Thank you!     Thank you for choosing [facility]  for your care. Our goal is always to provide you with excellent care. Hearing back from our patients is one way we can continue to improve our services. Please take a few minutes to complete the written survey that you may receive in the mail after your visit with us. Thank you!             Your Updated Medication List - Protect others around you: Learn how to safely use, store and throw away your medicines at www.disposemymeds.org.          This list is accurate as of 5/3/18 11:59 PM.  Always use your most recent med list.                   Brand Name Dispense Instructions for use Diagnosis    aspirin 81 MG EC tablet     90 tablet    Take 1 tablet (81 mg) by mouth daily    Benign essential hypertension       cholecalciferol 5000 units Tabs tablet    vitamin D3    90 tablet    Take 1 tablet (5,000 Units) by mouth daily    Vitamin D deficiency       clonazePAM 1 MG tablet    klonoPIN    90 tablet    Take 1 tablet (1 mg) by mouth 3 times daily as needed for anxiety    Social anxiety disorder, Moderate episode of recurrent major depressive disorder (H)       lisinopril 20 MG tablet    PRINIVIL/ZESTRIL    90 tablet    Take 1 tablet (20 mg) by mouth daily    Benign essential hypertension       metoprolol succinate 25 MG 24 hr tablet    TOPROL-XL    60 tablet    1 po BID    Benign essential hypertension       simvastatin 20 MG tablet    ZOCOR    90 tablet    Take 1 tablet (20 mg) by mouth At Bedtime    Hyperlipidemia LDL goal <100        SUMAtriptan 50 MG tablet    IMITREX    30 tablet    Take 1 tablet (50 mg) by mouth at onset of headache for migraine TAKE 1 AT ONSET OF HEADACHE, MAY REPEAT AFTER 2 HR, NOT TO EXEED 200MG DAILY    Intractable chronic migraine without aura and without status migrainosus       venlafaxine 75 MG tablet    EFFEXOR    90 tablet    Take 1 tablet (75 mg) by mouth 3 times daily    Social anxiety disorder, Moderate episode of recurrent major depressive disorder (H)

## 2018-05-03 NOTE — Clinical Note
See note re - toprol dose increase, pls notify pt  Yelitza JENKINS-[facility] [phone]

## 2018-05-03 NOTE — Clinical Note
Pt here for a bp check only and needs refills not sure if you wanted to change bp medication or not told him if so we would call and let him know and the refill dose will need to be adjusted.

## 2018-05-04 RX ORDER — VENLAFAXINE 75 MG/1
75 TABLET ORAL 3 TIMES DAILY
Qty: 90 TABLET | Refills: 5 | Status: SHIPPED | OUTPATIENT
Start: 2018-05-04 | End: 2018-07-11

## 2018-05-04 RX ORDER — LISINOPRIL 20 MG/1
20 TABLET ORAL DAILY
Qty: 90 TABLET | Refills: 1 | Status: SHIPPED | OUTPATIENT
Start: 2018-05-04 | End: 2018-07-11

## 2018-05-04 RX ORDER — SIMVASTATIN 20 MG
20 TABLET ORAL AT BEDTIME
Qty: 90 TABLET | Refills: 1 | Status: SHIPPED | OUTPATIENT
Start: 2018-05-04 | End: 2018-07-11

## 2018-05-04 RX ORDER — METOPROLOL SUCCINATE 25 MG/1
TABLET, EXTENDED RELEASE ORAL
Qty: 60 TABLET | Refills: 1 | Status: SHIPPED | OUTPATIENT
Start: 2018-05-04 | End: 2018-07-11

## 2018-05-04 RX ORDER — CLONAZEPAM 1 MG/1
1 TABLET ORAL 3 TIMES DAILY PRN
Qty: 90 TABLET | Refills: 5 | Status: SHIPPED | OUTPATIENT
Start: 2018-05-04 | End: 2018-07-11

## 2018-05-04 NOTE — PROGRESS NOTES
I increased his Toprol to BID  BP recheck in 2 weeks.  Almost at his goal of 120's/70's! Way to go!    Yelitza RODRÍGUEZ  [phone]

## 2018-05-04 NOTE — PROGRESS NOTES
Left message on personalized voice mail for pt with Yelitza's comments to incresse toprol to Bid, recheck in 2 weeks.  Call with questions

## 2018-06-06 ENCOUNTER — ALLIED HEALTH/NURSE VISIT (OUTPATIENT)
Dept: FAMILY MEDICINE | Facility: OTHER | Age: 43
End: 2018-06-06
Attending: NURSE PRACTITIONER
Payer: COMMERCIAL

## 2018-06-06 ENCOUNTER — TELEPHONE (OUTPATIENT)
Dept: FAMILY MEDICINE | Facility: OTHER | Age: 43
End: 2018-06-06

## 2018-06-06 VITALS — DIASTOLIC BLOOD PRESSURE: 90 MMHG | SYSTOLIC BLOOD PRESSURE: 122 MMHG | HEART RATE: 72 BPM

## 2018-06-06 DIAGNOSIS — Z01.30 BP CHECK: Primary | ICD-10-CM

## 2018-06-06 PROCEDURE — 99207 ZZC NO CHARGE NURSE ONLY: CPT

## 2018-06-06 NOTE — TELEPHONE ENCOUNTER
Pt notified, via personalized voice mail, of Yelitza's comments to increase Metoprolol to twice daily and recheck in 2 weeks

## 2018-07-11 ENCOUNTER — OFFICE VISIT (OUTPATIENT)
Dept: FAMILY MEDICINE | Facility: OTHER | Age: 43
End: 2018-07-11
Attending: NURSE PRACTITIONER
Payer: COMMERCIAL

## 2018-07-11 VITALS
OXYGEN SATURATION: 98 % | HEART RATE: 89 BPM | DIASTOLIC BLOOD PRESSURE: 86 MMHG | RESPIRATION RATE: 16 BRPM | BODY MASS INDEX: 37.52 KG/M2 | TEMPERATURE: 98.4 F | SYSTOLIC BLOOD PRESSURE: 122 MMHG | HEIGHT: 71 IN | WEIGHT: 268 LBS

## 2018-07-11 DIAGNOSIS — G43.719 INTRACTABLE CHRONIC MIGRAINE WITHOUT AURA AND WITHOUT STATUS MIGRAINOSUS: ICD-10-CM

## 2018-07-11 DIAGNOSIS — E78.5 HYPERLIPIDEMIA LDL GOAL <100: ICD-10-CM

## 2018-07-11 DIAGNOSIS — E55.9 VITAMIN D DEFICIENCY: ICD-10-CM

## 2018-07-11 DIAGNOSIS — Z79.899 TAKING A STATIN MEDICATION: ICD-10-CM

## 2018-07-11 DIAGNOSIS — I10 BENIGN ESSENTIAL HYPERTENSION: Primary | ICD-10-CM

## 2018-07-11 DIAGNOSIS — F40.10 SOCIAL ANXIETY DISORDER: ICD-10-CM

## 2018-07-11 DIAGNOSIS — R79.89 LFT ELEVATION: ICD-10-CM

## 2018-07-11 DIAGNOSIS — F33.1 MODERATE EPISODE OF RECURRENT MAJOR DEPRESSIVE DISORDER (H): ICD-10-CM

## 2018-07-11 LAB
ALBUMIN SERPL-MCNC: 4.6 G/DL (ref 3.4–5)
ALBUMIN UR-MCNC: NEGATIVE MG/DL
ALP SERPL-CCNC: 135 U/L (ref 40–150)
ALT SERPL W P-5'-P-CCNC: 338 U/L (ref 0–70)
ANION GAP SERPL CALCULATED.3IONS-SCNC: 12 MMOL/L (ref 3–14)
APPEARANCE UR: CLEAR
AST SERPL W P-5'-P-CCNC: 145 U/L (ref 0–45)
BILIRUB SERPL-MCNC: 0.7 MG/DL (ref 0.2–1.3)
BILIRUB UR QL STRIP: NEGATIVE
BUN SERPL-MCNC: 14 MG/DL (ref 7–30)
CALCIUM SERPL-MCNC: 9.7 MG/DL (ref 8.5–10.1)
CHLORIDE SERPL-SCNC: 102 MMOL/L (ref 94–109)
CHOLEST SERPL-MCNC: 269 MG/DL
CO2 SERPL-SCNC: 23 MMOL/L (ref 20–32)
COLOR UR AUTO: YELLOW
CREAT SERPL-MCNC: 0.81 MG/DL (ref 0.66–1.25)
GFR SERPL CREATININE-BSD FRML MDRD: >90 ML/MIN/1.7M2
GLUCOSE SERPL-MCNC: 110 MG/DL (ref 70–99)
GLUCOSE UR STRIP-MCNC: NEGATIVE MG/DL
HDLC SERPL-MCNC: 57 MG/DL
HGB UR QL STRIP: NEGATIVE
KETONES UR STRIP-MCNC: NEGATIVE MG/DL
LDLC SERPL CALC-MCNC: 173 MG/DL
LEUKOCYTE ESTERASE UR QL STRIP: NEGATIVE
NITRATE UR QL: NEGATIVE
NONHDLC SERPL-MCNC: 212 MG/DL
PH UR STRIP: 6.5 PH (ref 5–7)
POTASSIUM SERPL-SCNC: 4.2 MMOL/L (ref 3.4–5.3)
PROT SERPL-MCNC: 8.5 G/DL (ref 6.8–8.8)
SODIUM SERPL-SCNC: 137 MMOL/L (ref 133–144)
SOURCE: NORMAL
SP GR UR STRIP: <=1.005 (ref 1–1.03)
TRIGL SERPL-MCNC: 194 MG/DL
TSH SERPL DL<=0.005 MIU/L-ACNC: 0.7 MU/L (ref 0.4–4)
UROBILINOGEN UR STRIP-ACNC: 0.2 EU/DL (ref 0.2–1)

## 2018-07-11 PROCEDURE — G0463 HOSPITAL OUTPT CLINIC VISIT: HCPCS

## 2018-07-11 PROCEDURE — 82306 VITAMIN D 25 HYDROXY: CPT | Mod: ZL | Performed by: NURSE PRACTITIONER

## 2018-07-11 PROCEDURE — 84443 ASSAY THYROID STIM HORMONE: CPT | Mod: ZL | Performed by: NURSE PRACTITIONER

## 2018-07-11 PROCEDURE — 99214 OFFICE O/P EST MOD 30 MIN: CPT | Performed by: NURSE PRACTITIONER

## 2018-07-11 PROCEDURE — 80061 LIPID PANEL: CPT | Mod: ZL | Performed by: NURSE PRACTITIONER

## 2018-07-11 PROCEDURE — 81003 URINALYSIS AUTO W/O SCOPE: CPT | Mod: ZL | Performed by: NURSE PRACTITIONER

## 2018-07-11 PROCEDURE — 99000 SPECIMEN HANDLING OFFICE-LAB: CPT | Performed by: NURSE PRACTITIONER

## 2018-07-11 PROCEDURE — 80053 COMPREHEN METABOLIC PANEL: CPT | Mod: ZL | Performed by: NURSE PRACTITIONER

## 2018-07-11 PROCEDURE — 36415 COLL VENOUS BLD VENIPUNCTURE: CPT | Mod: ZL | Performed by: NURSE PRACTITIONER

## 2018-07-11 RX ORDER — SIMVASTATIN 40 MG
40 TABLET ORAL AT BEDTIME
Qty: 90 TABLET | Refills: 1 | Status: SHIPPED | OUTPATIENT
Start: 2018-07-11 | End: 2019-01-09

## 2018-07-11 RX ORDER — LISINOPRIL 20 MG/1
20 TABLET ORAL DAILY
Qty: 90 TABLET | Refills: 1 | Status: SHIPPED | OUTPATIENT
Start: 2018-07-11 | End: 2018-10-24

## 2018-07-11 RX ORDER — SIMVASTATIN 20 MG
20 TABLET ORAL AT BEDTIME
Qty: 90 TABLET | Refills: 1 | Status: SHIPPED | OUTPATIENT
Start: 2018-07-11 | End: 2018-07-11

## 2018-07-11 RX ORDER — METOPROLOL SUCCINATE 50 MG/1
50 TABLET, EXTENDED RELEASE ORAL 2 TIMES DAILY
Qty: 60 TABLET | Refills: 5 | Status: SHIPPED | OUTPATIENT
Start: 2018-07-11 | End: 2019-01-09

## 2018-07-11 RX ORDER — VENLAFAXINE 75 MG/1
75 TABLET ORAL 3 TIMES DAILY
Qty: 90 TABLET | Refills: 5 | Status: SHIPPED | OUTPATIENT
Start: 2018-07-11 | End: 2019-01-09

## 2018-07-11 RX ORDER — CLONAZEPAM 1 MG/1
1 TABLET ORAL 3 TIMES DAILY PRN
Qty: 90 TABLET | Refills: 5 | Status: SHIPPED | OUTPATIENT
Start: 2018-07-11 | End: 2019-01-09

## 2018-07-11 ASSESSMENT — ANXIETY QUESTIONNAIRES
1. FEELING NERVOUS, ANXIOUS, OR ON EDGE: SEVERAL DAYS
7. FEELING AFRAID AS IF SOMETHING AWFUL MIGHT HAPPEN: SEVERAL DAYS
5. BEING SO RESTLESS THAT IT IS HARD TO SIT STILL: NOT AT ALL
3. WORRYING TOO MUCH ABOUT DIFFERENT THINGS: NOT AT ALL
2. NOT BEING ABLE TO STOP OR CONTROL WORRYING: NOT AT ALL
4. TROUBLE RELAXING: SEVERAL DAYS
6. BECOMING EASILY ANNOYED OR IRRITABLE: NOT AT ALL
GAD7 TOTAL SCORE: 3

## 2018-07-11 ASSESSMENT — PAIN SCALES - GENERAL: PAINLEVEL: NO PAIN (0)

## 2018-07-11 NOTE — LETTER
My Migraine Action Plan      Date: 7/11/2018     My Name: Toño Gutierrez   YOB: 1975  My Pharmacy: [facility] [id number] [place] [address] [address]  AT [facility] [address]       My (Preventative) Control Medicine: SSRI        My Rescue Medicine: Imitrex   My Doctor: Yelitza Roche     My Clinic: [facility]  [address]  [facility] [address]  [phone]        GREEN ZONE = Good Control    My headache plan is working.   I can do what I need to do.           I WILL:     ? Keep managing my triggers.  ? Write in my migraine diary each time I have a headache.  ? Keep taking my preventive (controller) medicine daily.  ? Take my relief and rescue medicine as needed.             YELLOW ZONE = Not Enough Control    My headache plan isn t always working.   My headaches keep me from doing   some of the things I need to do.       I WILL:     ? Set goals to control my triggers and act on them.  ? Write in my migraine diary each time I have a headache and review it for                      patterns or new triggers.  ? Keep taking my preventive (controller) medicine daily.  ? Take my relief and rescue medicine as needed.  ? Call my doctor or clinic at if I stay in the Yellow Zone.             RED ZONE = Poor or No Control    My headache plan has  failed. I can t do anything  when I have one. My  medicines aren t working.           I WILL:   ? Set goals to control my triggers and act on them.  ? Write in my migraine diary each time I have a headache and review it for                      patterns or new triggers.  ? Keep taking my preventive (controller) medicine daily.  ? Take my relief and rescue medicine as needed.  ? Call my doctor or clinic or go to urgent care or an ER if I m having the worst                  headache of my life.  ? Call my doctor or clinic or go to urgent care or an ER if my medicine doesn t work.  ? Let my doctor or clinic know within 2 weeks if  I have gone to an urgent care or             emergency department.          Provider specific instructions:  No

## 2018-07-11 NOTE — LETTER
My Depression Action Plan  Name: Toño Gutierrez   Date of Birth 1975  Date: 7/11/2018    My doctor: Yelitza Roche   My clinic: [facility]  [address] [place] [address] [address]  [facility] [address]  [phone]          GREEN    ZONE   Good Control    What it looks like:     Things are going generally well. You have normal up s and down s. You may even feel depressed from time to time, but bad moods usually last less than a day.   What you need to do:  1. Continue to care for yourself (see self care plan)  2. Check your depression survival kit and update it as needed  3. Follow your physician s recommendations including any medication.  4. Do not stop taking medication unless you consult with your physician first.           YELLOW         ZONE Getting Worse    What it looks like:     Depression is starting to interfere with your life.     It may be hard to get out of bed; you may be starting to isolate yourself from others.    Symptoms of depression are starting to last most all day and this has happened for several days.     You may have suicidal thoughts but they are not constant.   What you need to do:     1. Call your care team, your response to treatment will improve if you keep your care team informed of your progress. Yellow periods are signs an adjustment may need to be made.     2. Continue your self-care, even if you have to fake it!    3. Talk to someone in your support network    4. Open up your depression survival kit           RED    ZONE Medical Alert - Get Help    What it looks like:     Depression is seriously interfering with your life.     You may experience these or other symptoms: You can t get out of bed most days, can t work or engage in other necessary activities, you have trouble taking care of basic hygiene, or basic responsibilities, thoughts of suicide or death that will not go away, self-injurious behavior.     What you need to do:  1. Call your care team and  request a same-day appointment. If they are not available (weekends or after hours) call your local crisis line, emergency room or 911.            Depression Self Care Plan / Survival Kit    Self-Care for Depression  Here s the deal. Your body and mind are really not as separate as most people think.  What you do and think affects how you feel and how you feel influences what you do and think. This means if you do things that people who feel good do, it will help you feel better.  Sometimes this is all it takes.  There is also a place for medication and therapy depending on how severe your depression is, so be sure to consult with your medical provider and/ or Behavioral Health Consultant if your symptoms are worsening or not improving.     In order to better manage my stress, I will:    Exercise  Get some form of exercise, every day. This will help reduce pain and release endorphins, the  feel good  chemicals in your brain. This is almost as good as taking antidepressants!  This is not the same as joining a gym and then never going! (they count on that by the way ) It can be as simple as just going for a walk or doing some gardening, anything that will get you moving.      Hygiene   Maintain good hygiene (Get out of bed in the morning, Make your bed, Brush your teeth, Take a shower, and Get dressed like you were going to work, even if you are unemployed).  If your clothes don't fit try to get ones that do.    Diet  I will strive to eat foods that are good for me, drink plenty of water, and avoid excessive sugar, caffeine, alcohol, and other mood-altering substances.  Some foods that are helpful in depression are: complex carbohydrates, B vitamins, flaxseed, fish or fish oil, fresh fruits and vegetables.    Psychotherapy  I agree to participate in Individual Therapy (if recommended).    Medication  If prescribed medications, I agree to take them.  Missing doses can result in serious side effects.  I understand that  drinking alcohol, or other illicit drug use, may cause potential side effects.  I will not stop my medication abruptly without first discussing it with my provider.    Staying Connected With Others  I will stay in touch with my friends, family members, and my primary care provider/team.    Use your imagination  Be creative.  We all have a creative side; it doesn t matter if it s oil painting, sand castles, or mud pies! This will also kick up the endorphins.    Witness Beauty  (AKA stop and smell the roses) Take a look outside, even in mid-winter. Notice colors, textures. Watch the squirrels and birds.     Service to others  Be of service to others.  There is always someone else in need.  By helping others we can  get out of ourselves  and remember the really important things.  This also provides opportunities for practicing all the other parts of the program.    Humor  Laugh and be silly!  Adjust your TV habits for less news and crime-drama and more comedy.    Control your stress  Try breathing deep, massage therapy, biofeedback, and meditation. Find time to relax each day.     My support system    Clinic Contact:  Phone number:    Contact 1:  Phone number:    Contact 2:  Phone number:    Tenriism/:  Phone number:    Therapist:  Phone number:    Local crisis center:    Phone number:    Other community support:  Phone number:

## 2018-07-11 NOTE — MR AVS SNAPSHOT
After Visit Summary   7/11/2018    Toño Gutierrez    MRN: [id number]           Patient Information     Date Of Birth          1975        Visit Information        Provider Department      7/11/2018 10:30 AM Yelitza Roche NP [facility]        Today's Diagnoses     Benign essential hypertension    -  1    Hyperlipidemia LDL goal <100        Taking a statin medication        Moderate episode of recurrent major depressive disorder (H)        Social anxiety disorder        Vitamin D deficiency        Intractable chronic migraine without aura and without status migrainosus          Care Instructions        1. Benign essential hypertension  - Comprehensive metabolic panel  - TSH with free T4 reflex  - UA reflex to Microscopic and Culture - [place]  - metoprolol succinate (TOPROL-XL) 50 MG 24 hr tablet; Take 1 tablet (50 mg) by mouth 2 times daily  Dispense: 60 tablet; Refill: 5  - lisinopril (PRINIVIL/ZESTRIL) 20 MG tablet; Take 1 tablet (20 mg) by mouth daily  Dispense: 90 tablet; Refill: 1  - BP check 2 weeks with the nurse, bring your home cuff for comparison    2. Hyperlipidemia LDL goal <100  - Lipid Profile  - Comprehensive metabolic panel  - simvastatin (ZOCOR) 20 MG tablet; Take 1 tablet (20 mg) by mouth At Bedtime  Dispense: 90 tablet; Refill: 1    3. Taking a statin medication  - Comprehensive metabolic panel    4. Moderate episode of recurrent major depressive disorder (H)  - DEPRESSION EDUCATION  - TSH with free T4 reflex  - venlafaxine (EFFEXOR) 75 MG tablet; Take 1 tablet (75 mg) by mouth 3 times daily  Dispense: 90 tablet; Refill: 5  - clonazePAM (KLONOPIN) 1 MG tablet; Take 1 tablet (1 mg) by mouth 3 times daily as needed for anxiety  Dispense: 90 tablet; Refill: 5    5. Social anxiety disorder  - TSH with free T4 reflex  - venlafaxine (EFFEXOR) 75 MG tablet; Take 1 tablet (75 mg) by mouth 3 times daily  Dispense: 90 tablet; Refill: 5  - clonazePAM (KLONOPIN) 1 MG  "tablet; Take 1 tablet (1 mg) by mouth 3 times daily as needed for anxiety  Dispense: 90 tablet; Refill: 5    6. Vitamin D deficiency  - Vitamin D Deficiency    7. Intractable chronic migraine without aura and without status migrainosus  - Continue plan of care        My nurse will call you with your lab results  FU 6 months          Yelitza Roche NP  [facility]            Follow-ups after your visit        Who to contact     If you have questions or need follow up information about today's clinic visit or your schedule please contact [facility] directly at [phone].  Normal or non-critical lab and imaging results will be communicated to you by MyChart, letter or phone within 4 business days after the clinic has received the results. If you do not hear from us within 7 days, please contact the clinic through MyChart or phone. If you have a critical or abnormal lab result, we will notify you by phone as soon as possible.  Submit refill requests through [employer] or call your pharmacy and they will forward the refill request to us. Please allow 3 business days for your refill to be completed.          Additional Information About Your Visit        Care EveryWhere ID     This is your Care EveryWhere ID. This could be used by other organizations to access your [place] medical records  [id number]        Your Vitals Were     Pulse Temperature Respirations Height Pulse Oximetry BMI (Body Mass Index)    89 98.4  F (36.9  C) (Tympanic) 16 5' 11\" (1.803 m) 98% 37.38 kg/m2       Blood Pressure from Last 3 Encounters:   07/11/18 122/86   06/06/18 122/90   05/03/18 (!) 120/98    Weight from Last 3 Encounters:   07/11/18 268 lb (121.6 kg)   11/13/17 265 lb (120.2 kg)   04/24/17 262 lb (118.8 kg)              We Performed the Following     *UA reflex to Microscopic and Culture - [facility]     Comprehensive metabolic panel     DEPRESSION EDUCATION     Lipid Profile     TSH with free T4 reflex  "    Vitamin D Deficiency          Today's Medication Changes          These changes are accurate as of 7/11/18 11:10 AM.  If you have any questions, ask your nurse or doctor.               These medicines have changed or have updated prescriptions.        Dose/Directions    metoprolol succinate 50 MG 24 hr tablet   Commonly known as:  TOPROL-XL   This may have changed:    - medication strength  - how much to take  - how to take this  - when to take this  - additional instructions   Used for:  Benign essential hypertension   Changed by:  Yelitza Roche NP        Dose:  50 mg   Take 1 tablet (50 mg) by mouth 2 times daily   Quantity:  60 tablet   Refills:  5            Where to get your medicines      These medications were sent to [facility] [id number] [place] [address] [address] AT [facility] [address] [place] [address]     Phone:  [phone]     lisinopril 20 MG tablet    metoprolol succinate 50 MG 24 hr tablet    simvastatin 20 MG tablet    venlafaxine 75 MG tablet         Some of these will need a paper prescription and others can be bought over the counter.  Ask your nurse if you have questions.     Bring a paper prescription for each of these medications     clonazePAM 1 MG tablet                Primary Care Provider Office Phone # Fax #    Yelitza Roche -[phone] [address]  [place] [facility] [address]        Equal Access to Services     ANGELY TOM AH: Hadii barrett rosado hadasho Soomaali, waaxda luqadaha, qaybta kaalmada adeegyada, lin malhotra haymckenna henry. So [facility] [place] [phone].    ATENCIÓN: Si habla español, tiene a juarez disposición servicios gratuitos de asistencia lingüística. Llame al [phone].    We comply with applicable federal civil rights laws and [place] laws. We do not discriminate on the basis of race, color, national origin, age, disability, sex, sexual orientation, or gender identity.            Thank you!      Thank you for choosing [facility]  for your care. Our goal is always to provide you with excellent care. Hearing back from our patients is one way we can continue to improve our services. Please take a few minutes to complete the written survey that you may receive in the mail after your visit with us. Thank you!             Your Updated Medication List - Protect others around you: Learn how to safely use, store and throw away your medicines at www.disposemymeds.org.          This list is accurate as of 7/11/18 11:10 AM.  Always use your most recent med list.                   Brand Name Dispense Instructions for use Diagnosis    aspirin 81 MG EC tablet     90 tablet    Take 1 tablet (81 mg) by mouth daily    Benign essential hypertension       cholecalciferol 5000 units Tabs tablet    vitamin D3    90 tablet    Take 1 tablet (5,000 Units) by mouth daily    Vitamin D deficiency       clonazePAM 1 MG tablet    klonoPIN    90 tablet    Take 1 tablet (1 mg) by mouth 3 times daily as needed for anxiety    Social anxiety disorder, Moderate episode of recurrent major depressive disorder (H)       lisinopril 20 MG tablet    PRINIVIL/ZESTRIL    90 tablet    Take 1 tablet (20 mg) by mouth daily    Benign essential hypertension       metoprolol succinate 50 MG 24 hr tablet    TOPROL-XL    60 tablet    Take 1 tablet (50 mg) by mouth 2 times daily    Benign essential hypertension       simvastatin 20 MG tablet    ZOCOR    90 tablet    Take 1 tablet (20 mg) by mouth At Bedtime    Hyperlipidemia LDL goal <100       SUMAtriptan 50 MG tablet    IMITREX    30 tablet    Take 1 tablet (50 mg) by mouth at onset of headache for migraine TAKE 1 AT ONSET OF HEADACHE, MAY REPEAT AFTER 2 HR, NOT TO EXEED 200MG DAILY    Intractable chronic migraine without aura and without status migrainosus       venlafaxine 75 MG tablet    EFFEXOR    90 tablet    Take 1 tablet (75 mg) by mouth 3 times daily    Social anxiety disorder,  Moderate episode of recurrent major depressive disorder (H)

## 2018-07-11 NOTE — LETTER
My Depression Action Plan  Name: Toño Gutierrez   Date of Birth 1975  Date: 7/11/2018    My doctor: Yelitza Roche   My clinic: [facility]  [address] [place] [address] [address]  [facility] [address]  [phone]          GREEN    ZONE   Good Control    What it looks like:     Things are going generally well. You have normal up s and down s. You may even feel depressed from time to time, but bad moods usually last less than a day.   What you need to do:  1. Continue to care for yourself (see self care plan)  2. Check your depression survival kit and update it as needed  3. Follow your physician s recommendations including any medication.  4. Do not stop taking medication unless you consult with your physician first.           YELLOW         ZONE Getting Worse    What it looks like:     Depression is starting to interfere with your life.     It may be hard to get out of bed; you may be starting to isolate yourself from others.    Symptoms of depression are starting to last most all day and this has happened for several days.     You may have suicidal thoughts but they are not constant.   What you need to do:     1. Call your care team, your response to treatment will improve if you keep your care team informed of your progress. Yellow periods are signs an adjustment may need to be made.     2. Continue your self-care, even if you have to fake it!    3. Talk to someone in your support network    4. Open up your depression survival kit           RED    ZONE Medical Alert - Get Help    What it looks like:     Depression is seriously interfering with your life.     You may experience these or other symptoms: You can t get out of bed most days, can t work or engage in other necessary activities, you have trouble taking care of basic hygiene, or basic responsibilities, thoughts of suicide or death that will not go away, self-injurious behavior.     What you need to do:  1. Call your care team and  request a same-day appointment. If they are not available (weekends or after hours) call your local crisis line, emergency room or 911.            Depression Self Care Plan / Survival Kit    Self-Care for Depression  Here s the deal. Your body and mind are really not as separate as most people think.  What you do and think affects how you feel and how you feel influences what you do and think. This means if you do things that people who feel good do, it will help you feel better.  Sometimes this is all it takes.  There is also a place for medication and therapy depending on how severe your depression is, so be sure to consult with your medical provider and/ or Behavioral Health Consultant if your symptoms are worsening or not improving.     In order to better manage my stress, I will:    Exercise  Get some form of exercise, every day. This will help reduce pain and release endorphins, the  feel good  chemicals in your brain. This is almost as good as taking antidepressants!  This is not the same as joining a gym and then never going! (they count on that by the way ) It can be as simple as just going for a walk or doing some gardening, anything that will get you moving.      Hygiene   Maintain good hygiene (Get out of bed in the morning, Make your bed, Brush your teeth, Take a shower, and Get dressed like you were going to work, even if you are unemployed).  If your clothes don't fit try to get ones that do.    Diet  I will strive to eat foods that are good for me, drink plenty of water, and avoid excessive sugar, caffeine, alcohol, and other mood-altering substances.  Some foods that are helpful in depression are: complex carbohydrates, B vitamins, flaxseed, fish or fish oil, fresh fruits and vegetables.    Psychotherapy  I agree to participate in Individual Therapy (if recommended).    Medication  If prescribed medications, I agree to take them.  Missing doses can result in serious side effects.  I understand that  drinking alcohol, or other illicit drug use, may cause potential side effects.  I will not stop my medication abruptly without first discussing it with my provider.    Staying Connected With Others  I will stay in touch with my friends, family members, and my primary care provider/team.    Use your imagination  Be creative.  We all have a creative side; it doesn t matter if it s oil painting, sand castles, or mud pies! This will also kick up the endorphins.    Witness Beauty  (AKA stop and smell the roses) Take a look outside, even in mid-winter. Notice colors, textures. Watch the squirrels and birds.     Service to others  Be of service to others.  There is always someone else in need.  By helping others we can  get out of ourselves  and remember the really important things.  This also provides opportunities for practicing all the other parts of the program.    Humor  Laugh and be silly!  Adjust your TV habits for less news and crime-drama and more comedy.    Control your stress  Try breathing deep, massage therapy, biofeedback, and meditation. Find time to relax each day.     My support system    Clinic Contact:  Phone number:    Contact 1:  Phone number:    Contact 2:  Phone number:    Druze/:  Phone number:    Therapist:  Phone number:    Local crisis center:    Phone number:    Other community support:  Phone number:

## 2018-07-11 NOTE — PROGRESS NOTES
SUBJECTIVE:   Toño Gutierrez is a 43 year old male who presents to clinic today for the following health issues:        Hyperlipidemia Follow-Up    Rate your low fat/cholesterol diet?: fair    Taking statin?  Yes, no muscle aches from statin    Other lipid medications/supplements?:  Fish oil/Omega 3, dose 1000mg without side effects        Hypertension Follow-updepr    Outpatient blood pressures are being checked at home.  Results are reads high.    Low Salt Diet: no added salt        Depression and Anxiety Follow-Up    Status since last visit: Improved with medicaiotns    Other associated symptoms:None    Complicating factors:     Significant life event: Yes-  Family and friends deaths     Current substance abuse: None        PHQ-9   Any Language  AMRTI-7  Suicide Assessment Five-step Evaluation and Treatment (SAFE-T)        Migraine Follow-Up    Headaches symptoms:  Improved very rare    Frequency  6: months     Duration of headaches: unknown    Able to do normal daily activities/work with migraines: No - bed    Rescue/Relief medication:sumatriptan (Imitrex)              Effectiveness: total relief    Preventative medication: None    Neurologic complications: No new stroke-like symptoms, loss of vision or speech, numbness or weakness    In the past 4 weeks, how often have you gone to Urgent Care or the emergency room because of your headaches?  0         Vitamin D Deficiency - lab due            Amount of exercise or physical activity: as able    Problems taking medications regularly: No    Medication side effects: none    Diet: low fat/cholesterol        Problem list and histories reviewed & adjusted, as indicated.  Additional history: as documented      Patient Active Problem List   Diagnosis     Major depression, recurrent (H)     ED (erectile dysfunction)     Intractable chronic migraine without aura     Social anxiety disorder     ACP (advance care planning)     Benign essential hypertension     Vitamin D  deficiency     Hyperlipidemia LDL goal <100     Taking a statin medication     Past Surgical History:   Procedure Laterality Date     CIRCUMCISION       ORTHOPEDIC SURGERY  2003    rt ruthie teixeira'ed and pinned     ring finger open reduction internal fixation      right       Social History   Substance Use Topics     Smoking status: Former Smoker     Packs/day: 0.50     Types: Cigarettes     Smokeless tobacco: Former User     Quit date: 2/13/2014     Alcohol use Yes     Family History   Problem Relation Age of Onset     Hypertension Maternal Grandmother      Crohn Disease Sister          Current Outpatient Prescriptions   Medication Sig Dispense Refill     aspirin 81 MG EC tablet Take 1 tablet (81 mg) by mouth daily 90 tablet 11     cholecalciferol (VITAMIN D3) 5000 UNITS TABS tablet Take 1 tablet (5,000 Units) by mouth daily 90 tablet 11     clonazePAM (KLONOPIN) 1 MG tablet Take 1 tablet (1 mg) by mouth 3 times daily as needed for anxiety 90 tablet 5     lisinopril (PRINIVIL/ZESTRIL) 20 MG tablet Take 1 tablet (20 mg) by mouth daily 90 tablet 1     metoprolol succinate (TOPROL-XL) 50 MG 24 hr tablet Take 1 tablet (50 mg) by mouth 2 times daily 60 tablet 5     simvastatin (ZOCOR) 20 MG tablet Take 1 tablet (20 mg) by mouth At Bedtime 90 tablet 1     SUMAtriptan (IMITREX) 50 MG tablet Take 1 tablet (50 mg) by mouth at onset of headache for migraine TAKE 1 AT ONSET OF HEADACHE, MAY REPEAT AFTER 2 HR, NOT TO EXEED 200MG DAILY 30 tablet 3     venlafaxine (EFFEXOR) 75 MG tablet Take 1 tablet (75 mg) by mouth 3 times daily 90 tablet 5     [DISCONTINUED] clonazePAM (KLONOPIN) 1 MG tablet Take 1 tablet (1 mg) by mouth 3 times daily as needed for anxiety 90 tablet 5     [DISCONTINUED] lisinopril (PRINIVIL/ZESTRIL) 20 MG tablet Take 1 tablet (20 mg) by mouth daily 90 tablet 1     [DISCONTINUED] metoprolol succinate (TOPROL-XL) 25 MG 24 hr tablet 1 po BID (Patient taking differently: 1 po BID   Patient takes 2 pills at the  same time before bede) 60 tablet 1     [DISCONTINUED] simvastatin (ZOCOR) 20 MG tablet Take 1 tablet (20 mg) by mouth At Bedtime 90 tablet 1     [DISCONTINUED] venlafaxine (EFFEXOR) 75 MG tablet Take 1 tablet (75 mg) by mouth 3 times daily 90 tablet 5     Allergies   Allergen Reactions     Erythromycin Base [Kdc:Yellow Dye+Erythromycin+Brilliant Blue Fcf]      Recent Labs   Lab Test  03/20/18   1044  11/13/17   1547   LDL  296*   --    HDL  50   --    TRIG  186*   --    CR  0.85  0.75   GFRESTIMATED  >90  >90   GFRESTBLACK  >90  >90   POTASSIUM  4.2  3.6   TSH  1.33  2.50      BP Readings from Last 3 Encounters:   07/11/18 122/86   06/06/18 122/90   05/03/18 (!) 120/98    Wt Readings from Last 3 Encounters:   07/11/18 268 lb (121.6 kg)   11/13/17 265 lb (120.2 kg)   04/24/17 262 lb (118.8 kg)                  Labs reviewed in EPIC    Reviewed and updated as needed this visit by clinical staff  Tobacco  Allergies  Meds  Problems  Med Hx  Surg Hx  Fam Hx  Soc Hx        Reviewed and updated as needed this visit by Provider  Tobacco         ROS:  CONSTITUTIONAL: NEGATIVE for fever, chills, change in weight  INTEGUMENTARY/SKIN: NEGATIVE for worrisome rashes, moles or lesions  EYES: NEGATIVE for vision changes or irritation  ENT/MOUTH: NEGATIVE for ear, mouth and throat problems  RESP: NEGATIVE for significant cough or SOB  CV: NEGATIVE for chest pain, palpitations or peripheral edema  GI: NEGATIVE for nausea, abdominal pain, heartburn, or change in bowel habits  : NEGATIVE for frequency, dysuria, or hematuria  MUSCULOSKELETAL: NEGATIVE for significant arthralgias or myalgia  NEURO: NEGATIVE for weakness, dizziness or paresthesias  ENDOCRINE: NEGATIVE for temperature intolerance, skin/hair changes  HEME: NEGATIVE for bleeding problems  PSYCHIATRIC: NEGATIVE for changes in mood or affect    OBJECTIVE:     /86 (BP Location: Right arm, Patient Position: Sitting, Cuff Size: Adult Large)  Pulse 89  Temp 98.4  " F (36.9  C) (Tympanic)  Resp 16  Ht 5' 11\" (1.803 m)  Wt 268 lb (121.6 kg)  SpO2 98%  BMI 37.38 kg/m2  Body mass index is 37.38 kg/(m^2).       GENERAL: healthy, alert and no distress  EYES: Eyes grossly normal to inspection, PERRL and conjunctivae and sclerae normal  HENT: ear canals and TM's normal, nose and mouth without ulcers or lesions  NECK: no adenopathy, no asymmetry, masses, or scars and thyroid normal to palpation  RESP: lungs clear to auscultation - no rales, rhonchi or wheezes  CV: regular rate and rhythm, normal S1 S2, no S3 or S4, no murmur, click or rub, no peripheral edema and peripheral pulses strong  MS: no gross musculoskeletal defects noted, no edema  SKIN: no suspicious lesions or rashes  PSYCH: mentation appears normal, affect normal/bright    Results for orders placed or performed in visit on 07/11/18 (from the past 24 hour(s))   Lipid Profile   Result Value Ref Range    Cholesterol 269 (H) <200 mg/dL    Triglycerides 194 (H) <150 mg/dL    HDL Cholesterol 57 >39 mg/dL    LDL Cholesterol Calculated 173 (H) <100 mg/dL    Non HDL Cholesterol 212 (H) <130 mg/dL   Comprehensive metabolic panel   Result Value Ref Range    Sodium 137 133 - 144 mmol/L    Potassium 4.2 3.4 - 5.3 mmol/L    Chloride 102 94 - 109 mmol/L    Carbon Dioxide 23 20 - 32 mmol/L    Anion Gap 12 3 - 14 mmol/L    Glucose 110 (H) 70 - 99 mg/dL    Urea Nitrogen 14 7 - 30 mg/dL    Creatinine 0.81 0.66 - 1.25 mg/dL    GFR Estimate >90 >60 mL/min/1.7m2    GFR Estimate If Black >90 >60 mL/min/1.7m2    Calcium 9.7 8.5 - 10.1 mg/dL    Bilirubin Total 0.7 0.2 - 1.3 mg/dL    Albumin 4.6 3.4 - 5.0 g/dL    Protein Total 8.5 6.8 - 8.8 g/dL    Alkaline Phosphatase 135 40 - 150 U/L     (H) 0 - 70 U/L     (H) 0 - 45 U/L   TSH with free T4 reflex   Result Value Ref Range    TSH 0.70 0.40 - 4.00 mU/L   *UA reflex to Microscopic and Culture - [facility]   Result Value Ref Range    Color Urine Yellow     Appearance Urine " Clear     Glucose Urine Negative NEG^Negative mg/dL    Bilirubin Urine Negative NEG^Negative    Ketones Urine Negative NEG^Negative mg/dL    Specific Gravity Urine <=1.005 1.003 - 1.035    Blood Urine Negative NEG^Negative    pH Urine 6.5 5.0 - 7.0 pH    Protein Albumin Urine Negative NEG^Negative mg/dL    Urobilinogen Urine 0.2 0.2 - 1.0 EU/dL    Nitrite Urine Negative NEG^Negative    Leukocyte Esterase Urine Negative NEG^Negative    Source Midstream Urine        The 10-year ASCVD risk score (Bishop BELEM Jr, et al., 2013) is: 2%    Values used to calculate the score:      Age: 43 years      Sex: Male      Is Non- : No      Diabetic: No      Tobacco smoker: No      Systolic Blood Pressure: 122 mmHg      Is BP treated: Yes      HDL Cholesterol: 57 mg/dL      Total Cholesterol: 269 mg/dL      ASSESSMENT/PLAN:     Hyperlipidemia; controlled   Plan:  No changes in the patient's current treatment plan    Hypertension; controlled   Associated with the following complications:    None   Plan:  No changes in the patient's current treatment plan    Depression; recurrent episode-- Moderate   Associated with the following complications:    None   Plan:  No changes in the patient's current treatment plan    Migraine: controlled   Plan:  No changes in the patient's current treatment plan          1. Benign essential hypertension  - Comprehensive metabolic panel  - TSH with free T4 reflex  - UA reflex to Microscopic and Culture - [facility]  - metoprolol succinate (TOPROL-XL) 50 MG 24 hr tablet; Take 1 tablet (50 mg) by mouth 2 times daily  Dispense: 60 tablet; Refill: 5  - lisinopril (PRINIVIL/ZESTRIL) 20 MG tablet; Take 1 tablet (20 mg) by mouth daily  Dispense: 90 tablet; Refill: 1  - BP check 2 weeks with the nurse, bring your home cuff for comparison    2. Hyperlipidemia LDL goal <100  - Lipid Profile  - Comprehensive metabolic panel  - simvastatin (ZOCOR) 20 MG tablet; Take 1 tablet (20 mg) by  mouth At Bedtime  Dispense: 90 tablet; Refill: 1    3. Taking a statin medication  - Comprehensive metabolic panel    4. Moderate episode of recurrent major depressive disorder (H)  - DEPRESSION EDUCATION  - TSH with free T4 reflex  - venlafaxine (EFFEXOR) 75 MG tablet; Take 1 tablet (75 mg) by mouth 3 times daily  Dispense: 90 tablet; Refill: 5  - clonazePAM (KLONOPIN) 1 MG tablet; Take 1 tablet (1 mg) by mouth 3 times daily as needed for anxiety  Dispense: 90 tablet; Refill: 5    5. Social anxiety disorder  - TSH with free T4 reflex  - venlafaxine (EFFEXOR) 75 MG tablet; Take 1 tablet (75 mg) by mouth 3 times daily  Dispense: 90 tablet; Refill: 5  - clonazePAM (KLONOPIN) 1 MG tablet; Take 1 tablet (1 mg) by mouth 3 times daily as needed for anxiety  Dispense: 90 tablet; Refill: 5    6. Vitamin D deficiency  - Vitamin D Deficiency    7. Intractable chronic migraine without aura and without status migrainosus  - Continue plan of care        My nurse will call you with your lab results  FU 6 months          Yelitza Roche NP  [facility]

## 2018-07-11 NOTE — PATIENT INSTRUCTIONS
1. Benign essential hypertension  - Comprehensive metabolic panel  - TSH with free T4 reflex  - UA reflex to Microscopic and Culture - [facility]  - metoprolol succinate (TOPROL-XL) 50 MG 24 hr tablet; Take 1 tablet (50 mg) by mouth 2 times daily  Dispense: 60 tablet; Refill: 5  - lisinopril (PRINIVIL/ZESTRIL) 20 MG tablet; Take 1 tablet (20 mg) by mouth daily  Dispense: 90 tablet; Refill: 1  - BP check 2 weeks with the nurse, bring your home cuff for comparison    2. Hyperlipidemia LDL goal <100  - Lipid Profile  - Comprehensive metabolic panel  - simvastatin (ZOCOR) 20 MG tablet; Take 1 tablet (20 mg) by mouth At Bedtime  Dispense: 90 tablet; Refill: 1    3. Taking a statin medication  - Comprehensive metabolic panel    4. Moderate episode of recurrent major depressive disorder (H)  - DEPRESSION EDUCATION  - TSH with free T4 reflex  - venlafaxine (EFFEXOR) 75 MG tablet; Take 1 tablet (75 mg) by mouth 3 times daily  Dispense: 90 tablet; Refill: 5  - clonazePAM (KLONOPIN) 1 MG tablet; Take 1 tablet (1 mg) by mouth 3 times daily as needed for anxiety  Dispense: 90 tablet; Refill: 5    5. Social anxiety disorder  - TSH with free T4 reflex  - venlafaxine (EFFEXOR) 75 MG tablet; Take 1 tablet (75 mg) by mouth 3 times daily  Dispense: 90 tablet; Refill: 5  - clonazePAM (KLONOPIN) 1 MG tablet; Take 1 tablet (1 mg) by mouth 3 times daily as needed for anxiety  Dispense: 90 tablet; Refill: 5    6. Vitamin D deficiency  - Vitamin D Deficiency    7. Intractable chronic migraine without aura and without status migrainosus  - Continue plan of care        My nurse will call you with your lab results  FU 6 months          Yelitza Roche NP  [facility]

## 2018-07-11 NOTE — LETTER
[facility]  [address]  [place] [address]  [phone]        September 19, 2018    Toño Gutierrez  [address] [address]              Dear Toño Gutierrez    This is to remind you that your non fasting lab is due.    You may call our office at [phone] to schedule an appointment.    Please disregard this notice if you have already had your labs drawn or made an appointment.        Sincerely,        Yelitza MARISCAL,NP

## 2018-07-11 NOTE — NURSING NOTE
"Chief Complaint   Patient presents with     Hypertension     Re - check BP  and FU due, as is lab     Lipids     Lab due     Recheck Medication     Vitamin D deficiency  - lab due     Headache     Migraines, re assess     Depression     Update due     Anxiety     Update due       Initial /88 (BP Location: Right arm, Patient Position: Sitting, Cuff Size: Adult Large)  Pulse 89  Temp 98.4  F (36.9  C) (Tympanic)  Resp 16  Ht 5' 11\" (1.803 m)  Wt 268 lb (121.6 kg)  SpO2 98%  BMI 37.38 kg/m2 Estimated body mass index is 37.38 kg/(m^2) as calculated from the following:    Height as of this encounter: 5' 11\" (1.803 m).    Weight as of this encounter: 268 lb (121.6 kg).  Medication Reconciliation: complete    Arpita Gale LPN    "

## 2018-07-12 LAB — DEPRECATED CALCIDIOL+CALCIFEROL SERPL-MC: 105 UG/L (ref 20–75)

## 2018-07-12 ASSESSMENT — PATIENT HEALTH QUESTIONNAIRE - PHQ9: SUM OF ALL RESPONSES TO PHQ QUESTIONS 1-9: 1

## 2018-07-12 ASSESSMENT — ANXIETY QUESTIONNAIRES: GAD7 TOTAL SCORE: 3

## 2018-10-24 ENCOUNTER — ALLIED HEALTH/NURSE VISIT (OUTPATIENT)
Dept: FAMILY MEDICINE | Facility: OTHER | Age: 43
End: 2018-10-24
Attending: NURSE PRACTITIONER
Payer: COMMERCIAL

## 2018-10-24 DIAGNOSIS — R79.89 LFT ELEVATION: ICD-10-CM

## 2018-10-24 DIAGNOSIS — Z23 NEED FOR PROPHYLACTIC VACCINATION AND INOCULATION AGAINST INFLUENZA: Primary | ICD-10-CM

## 2018-10-24 DIAGNOSIS — I10 BENIGN ESSENTIAL HYPERTENSION: ICD-10-CM

## 2018-10-24 DIAGNOSIS — Z79.899 ENCOUNTER FOR MONITORING STATIN THERAPY: ICD-10-CM

## 2018-10-24 DIAGNOSIS — E78.5 HYPERLIPIDEMIA LDL GOAL <100: Primary | ICD-10-CM

## 2018-10-24 DIAGNOSIS — Z51.81 ENCOUNTER FOR MONITORING STATIN THERAPY: ICD-10-CM

## 2018-10-24 LAB
ALBUMIN SERPL-MCNC: 4.4 G/DL (ref 3.4–5)
ALBUMIN SERPL-MCNC: 4.5 G/DL (ref 3.4–5)
ALP SERPL-CCNC: 100 U/L (ref 40–150)
ALP SERPL-CCNC: 104 U/L (ref 40–150)
ALT SERPL W P-5'-P-CCNC: 110 U/L (ref 0–70)
ALT SERPL W P-5'-P-CCNC: 113 U/L (ref 0–70)
ANION GAP SERPL CALCULATED.3IONS-SCNC: 8 MMOL/L (ref 3–14)
AST SERPL W P-5'-P-CCNC: 46 U/L (ref 0–45)
AST SERPL W P-5'-P-CCNC: 49 U/L (ref 0–45)
BILIRUB DIRECT SERPL-MCNC: 0.2 MG/DL (ref 0–0.2)
BILIRUB SERPL-MCNC: 0.6 MG/DL (ref 0.2–1.3)
BILIRUB SERPL-MCNC: 0.6 MG/DL (ref 0.2–1.3)
BUN SERPL-MCNC: 12 MG/DL (ref 7–30)
CALCIUM SERPL-MCNC: 9 MG/DL (ref 8.5–10.1)
CHLORIDE SERPL-SCNC: 103 MMOL/L (ref 94–109)
CHOLEST SERPL-MCNC: 182 MG/DL
CO2 SERPL-SCNC: 24 MMOL/L (ref 20–32)
CREAT SERPL-MCNC: 0.86 MG/DL (ref 0.66–1.25)
GFR SERPL CREATININE-BSD FRML MDRD: >90 ML/MIN/1.7M2
GLUCOSE SERPL-MCNC: 98 MG/DL (ref 70–99)
HDLC SERPL-MCNC: 45 MG/DL
LDLC SERPL CALC-MCNC: 119 MG/DL
NONHDLC SERPL-MCNC: 137 MG/DL
POTASSIUM SERPL-SCNC: 4 MMOL/L (ref 3.4–5.3)
PROT SERPL-MCNC: 8.2 G/DL (ref 6.8–8.8)
PROT SERPL-MCNC: 8.2 G/DL (ref 6.8–8.8)
SODIUM SERPL-SCNC: 135 MMOL/L (ref 133–144)
TRIGL SERPL-MCNC: 91 MG/DL

## 2018-10-24 PROCEDURE — 90686 IIV4 VACC NO PRSV 0.5 ML IM: CPT

## 2018-10-24 PROCEDURE — 80061 LIPID PANEL: CPT | Mod: ZL | Performed by: NURSE PRACTITIONER

## 2018-10-24 PROCEDURE — 36415 COLL VENOUS BLD VENIPUNCTURE: CPT | Mod: ZL | Performed by: NURSE PRACTITIONER

## 2018-10-24 PROCEDURE — 80076 HEPATIC FUNCTION PANEL: CPT | Mod: ZL | Performed by: NURSE PRACTITIONER

## 2018-10-24 PROCEDURE — 80053 COMPREHEN METABOLIC PANEL: CPT | Mod: ZL | Performed by: NURSE PRACTITIONER

## 2018-10-24 PROCEDURE — 90471 IMMUNIZATION ADMIN: CPT

## 2018-10-24 RX ORDER — LISINOPRIL 20 MG/1
20 TABLET ORAL DAILY
Qty: 90 TABLET | Refills: 1 | Status: SHIPPED | OUTPATIENT
Start: 2018-10-24 | End: 2019-01-09

## 2018-10-24 NOTE — MR AVS SNAPSHOT
After Visit Summary   10/24/2018    Toño Gutierrez    MRN: [id number]           Patient Information     Date Of Birth          1975        Visit Information        Provider Department      10/24/2018 2:30 PM [facility] NURSE [facility]        Today's Diagnoses     Need for prophylactic vaccination and inoculation against influenza    -  1       Follow-ups after your visit        Who to contact     If you have questions or need follow up information about today's clinic visit or your schedule please contact [facility] directly at [phone].  Normal or non-critical lab and imaging results will be communicated to you by MyChart, letter or phone within 4 business days after the clinic has received the results. If you do not hear from us within 7 days, please contact the clinic through MyChart or phone. If you have a critical or abnormal lab result, we will notify you by phone as soon as possible.  Submit refill requests through [employer] or call your pharmacy and they will forward the refill request to us. Please allow 3 business days for your refill to be completed.          Additional Information About Your Visit        Care EveryWhere ID     This is your Care EveryWhere ID. This could be used by other organizations to access your [place] medical records  [id number]         Blood Pressure from Last 3 Encounters:   07/11/18 122/86   06/06/18 122/90   05/03/18 (!) 120/98    Weight from Last 3 Encounters:   07/11/18 268 lb (121.6 kg)   11/13/17 265 lb (120.2 kg)   04/24/17 262 lb (118.8 kg)              We Performed the Following     HC FLU VAC PRESRV FREE QUAD SPLIT VIR 3+YRS IM     Vaccine Administration, Initial [[id number]]        Primary Care Provider Office Phone # Fax #    Yelitza Roche [phone] [address]  [place] [address]        Equal Access to Services     ANGELY TOM : cisco Quach  karoline tawandanoni pizanorohini buckner, lin hicks ah. So [facility] [phone].    ATENCIÓN: Si skyla silveira, tiene a juarez disposición servicios gratuitos de asistencia lingüística. Nellie al [phone].    We comply with applicable federal civil rights laws and [place] laws. We do not discriminate on the basis of race, color, national origin, age, disability, sex, sexual orientation, or gender identity.            Thank you!     Thank you for choosing [facility]  for your care. Our goal is always to provide you with excellent care. Hearing back from our patients is one way we can continue to improve our services. Please take a few minutes to complete the written survey that you may receive in the mail after your visit with us. Thank you!             Your Updated Medication List - Protect others around you: Learn how to safely use, store and throw away your medicines at www.disposemymeds.org.          This list is accurate as of 10/24/18  2:34 PM.  Always use your most recent med list.                   Brand Name Dispense Instructions for use Diagnosis    aspirin 81 MG EC tablet     90 tablet    Take 1 tablet (81 mg) by mouth daily    Benign essential hypertension       cholecalciferol 5000 units Tabs tablet    vitamin D3    90 tablet    Take 1 tablet (5,000 Units) by mouth daily    Vitamin D deficiency       clonazePAM 1 MG tablet    klonoPIN    90 tablet    Take 1 tablet (1 mg) by mouth 3 times daily as needed for anxiety    Social anxiety disorder, Moderate episode of recurrent major depressive disorder (H)       lisinopril 20 MG tablet    PRINIVIL/ZESTRIL    90 tablet    Take 1 tablet (20 mg) by mouth daily    Benign essential hypertension       metoprolol succinate 50 MG 24 hr tablet    TOPROL-XL    60 tablet    Take 1 tablet (50 mg) by mouth 2 times daily    Benign essential hypertension       simvastatin 40 MG tablet    ZOCOR    90 tablet    Take 1 tablet (40  mg) by mouth At Bedtime    Hyperlipidemia LDL goal <100       SUMAtriptan 50 MG tablet    IMITREX    30 tablet    Take 1 tablet (50 mg) by mouth at onset of headache for migraine TAKE 1 AT ONSET OF HEADACHE, MAY REPEAT AFTER 2 HR, NOT TO EXEED 200MG DAILY    Intractable chronic migraine without aura and without status migrainosus       venlafaxine 75 MG tablet    EFFEXOR    90 tablet    Take 1 tablet (75 mg) by mouth 3 times daily    Social anxiety disorder, Moderate episode of recurrent major depressive disorder (H)

## 2018-10-24 NOTE — PROGRESS NOTES

## 2019-01-09 ENCOUNTER — OFFICE VISIT (OUTPATIENT)
Dept: FAMILY MEDICINE | Facility: OTHER | Age: 44
End: 2019-01-09
Attending: NURSE PRACTITIONER
Payer: COMMERCIAL

## 2019-01-09 VITALS
HEIGHT: 71 IN | BODY MASS INDEX: 33.82 KG/M2 | DIASTOLIC BLOOD PRESSURE: 78 MMHG | SYSTOLIC BLOOD PRESSURE: 132 MMHG | WEIGHT: 241.6 LBS | HEART RATE: 83 BPM | OXYGEN SATURATION: 98 % | TEMPERATURE: 97.1 F | RESPIRATION RATE: 16 BRPM

## 2019-01-09 DIAGNOSIS — G43.719 INTRACTABLE CHRONIC MIGRAINE WITHOUT AURA AND WITHOUT STATUS MIGRAINOSUS: ICD-10-CM

## 2019-01-09 DIAGNOSIS — E55.9 VITAMIN D DEFICIENCY: ICD-10-CM

## 2019-01-09 DIAGNOSIS — F40.10 SOCIAL ANXIETY DISORDER: ICD-10-CM

## 2019-01-09 DIAGNOSIS — I10 BENIGN ESSENTIAL HYPERTENSION: ICD-10-CM

## 2019-01-09 DIAGNOSIS — F33.1 MODERATE EPISODE OF RECURRENT MAJOR DEPRESSIVE DISORDER (H): ICD-10-CM

## 2019-01-09 DIAGNOSIS — E78.5 HYPERLIPIDEMIA LDL GOAL <100: Primary | ICD-10-CM

## 2019-01-09 DIAGNOSIS — Z79.899 TAKING A STATIN MEDICATION: ICD-10-CM

## 2019-01-09 LAB
ALBUMIN SERPL-MCNC: 4.3 G/DL (ref 3.4–5)
ALP SERPL-CCNC: 113 U/L (ref 40–150)
ALT SERPL W P-5'-P-CCNC: 85 U/L (ref 0–70)
ANION GAP SERPL CALCULATED.3IONS-SCNC: 8 MMOL/L (ref 3–14)
AST SERPL W P-5'-P-CCNC: 39 U/L (ref 0–45)
BILIRUB SERPL-MCNC: 0.8 MG/DL (ref 0.2–1.3)
BUN SERPL-MCNC: 14 MG/DL (ref 7–30)
CALCIUM SERPL-MCNC: 9.1 MG/DL (ref 8.5–10.1)
CHLORIDE SERPL-SCNC: 103 MMOL/L (ref 94–109)
CHOLEST SERPL-MCNC: 197 MG/DL
CO2 SERPL-SCNC: 25 MMOL/L (ref 20–32)
CREAT SERPL-MCNC: 0.87 MG/DL (ref 0.66–1.25)
GFR SERPL CREATININE-BSD FRML MDRD: >90 ML/MIN/{1.73_M2}
GLUCOSE SERPL-MCNC: 110 MG/DL (ref 70–99)
HDLC SERPL-MCNC: 60 MG/DL
LDLC SERPL CALC-MCNC: 111 MG/DL
NONHDLC SERPL-MCNC: 137 MG/DL
POTASSIUM SERPL-SCNC: 4.3 MMOL/L (ref 3.4–5.3)
PROT SERPL-MCNC: 8.1 G/DL (ref 6.8–8.8)
SODIUM SERPL-SCNC: 136 MMOL/L (ref 133–144)
TRIGL SERPL-MCNC: 131 MG/DL
TSH SERPL DL<=0.005 MIU/L-ACNC: 1.17 MU/L (ref 0.4–4)

## 2019-01-09 PROCEDURE — 99000 SPECIMEN HANDLING OFFICE-LAB: CPT | Performed by: NURSE PRACTITIONER

## 2019-01-09 PROCEDURE — 80061 LIPID PANEL: CPT | Mod: ZL | Performed by: NURSE PRACTITIONER

## 2019-01-09 PROCEDURE — 99214 OFFICE O/P EST MOD 30 MIN: CPT | Performed by: NURSE PRACTITIONER

## 2019-01-09 PROCEDURE — 36415 COLL VENOUS BLD VENIPUNCTURE: CPT | Mod: ZL | Performed by: NURSE PRACTITIONER

## 2019-01-09 PROCEDURE — G0463 HOSPITAL OUTPT CLINIC VISIT: HCPCS

## 2019-01-09 PROCEDURE — 80053 COMPREHEN METABOLIC PANEL: CPT | Mod: ZL | Performed by: NURSE PRACTITIONER

## 2019-01-09 PROCEDURE — 82306 VITAMIN D 25 HYDROXY: CPT | Mod: ZL | Performed by: NURSE PRACTITIONER

## 2019-01-09 PROCEDURE — 84443 ASSAY THYROID STIM HORMONE: CPT | Mod: ZL | Performed by: NURSE PRACTITIONER

## 2019-01-09 RX ORDER — LISINOPRIL 20 MG/1
20 TABLET ORAL DAILY
Qty: 90 TABLET | Refills: 1 | Status: SHIPPED | OUTPATIENT
Start: 2019-01-09 | End: 2019-06-11

## 2019-01-09 RX ORDER — METOPROLOL SUCCINATE 50 MG/1
50 TABLET, EXTENDED RELEASE ORAL 2 TIMES DAILY
Qty: 180 TABLET | Refills: 1 | Status: SHIPPED | OUTPATIENT
Start: 2019-01-09 | End: 2019-06-11

## 2019-01-09 RX ORDER — SUMATRIPTAN 50 MG/1
50 TABLET, FILM COATED ORAL
Qty: 30 TABLET | Refills: 3 | Status: SHIPPED | OUTPATIENT
Start: 2019-01-09 | End: 2019-12-16

## 2019-01-09 RX ORDER — VENLAFAXINE 75 MG/1
75 TABLET ORAL 3 TIMES DAILY
Qty: 180 TABLET | Refills: 1 | Status: SHIPPED | OUTPATIENT
Start: 2019-01-09 | End: 2019-05-10

## 2019-01-09 RX ORDER — CLONAZEPAM 1 MG/1
1 TABLET ORAL 3 TIMES DAILY PRN
Qty: 90 TABLET | Refills: 5 | Status: SHIPPED | OUTPATIENT
Start: 2019-01-09 | End: 2019-08-12

## 2019-01-09 RX ORDER — SIMVASTATIN 40 MG
40 TABLET ORAL AT BEDTIME
Qty: 90 TABLET | Refills: 1 | Status: SHIPPED | OUTPATIENT
Start: 2019-01-09 | End: 2019-06-11

## 2019-01-09 ASSESSMENT — ANXIETY QUESTIONNAIRES
5. BEING SO RESTLESS THAT IT IS HARD TO SIT STILL: NOT AT ALL
3. WORRYING TOO MUCH ABOUT DIFFERENT THINGS: NOT AT ALL
GAD7 TOTAL SCORE: 2
6. BECOMING EASILY ANNOYED OR IRRITABLE: NOT AT ALL
2. NOT BEING ABLE TO STOP OR CONTROL WORRYING: NOT AT ALL
IF YOU CHECKED OFF ANY PROBLEMS ON THIS QUESTIONNAIRE, HOW DIFFICULT HAVE THESE PROBLEMS MADE IT FOR YOU TO DO YOUR WORK, TAKE CARE OF THINGS AT HOME, OR GET ALONG WITH OTHER PEOPLE: NOT DIFFICULT AT ALL
1. FEELING NERVOUS, ANXIOUS, OR ON EDGE: NOT AT ALL
7. FEELING AFRAID AS IF SOMETHING AWFUL MIGHT HAPPEN: SEVERAL DAYS
4. TROUBLE RELAXING: SEVERAL DAYS

## 2019-01-09 ASSESSMENT — PATIENT HEALTH QUESTIONNAIRE - PHQ9: SUM OF ALL RESPONSES TO PHQ QUESTIONS 1-9: 0

## 2019-01-09 ASSESSMENT — PAIN SCALES - GENERAL: PAINLEVEL: NO PAIN (0)

## 2019-01-09 ASSESSMENT — MIFFLIN-ST. JEOR: SCORE: 2023.02

## 2019-01-09 NOTE — RESULT ENCOUNTER NOTE
Overall nice labs!  fsh oil 3 per day  Low fat diet    D level is pending    Follow-up 6 mos    [facility]  [phone]

## 2019-01-09 NOTE — NURSING NOTE
"Chief Complaint   Patient presents with     Lipids     Hypertension     Depression     Anxiety       Initial /90 (BP Location: Right arm, Patient Position: Chair, Cuff Size: Adult Large)   Pulse 83   Temp 97.1  F (36.2  C) (Tympanic)   Resp 16   Ht 1.803 m (5' 11\")   Wt 109.6 kg (241 lb 9.6 oz)   SpO2 98%   BMI 33.70 kg/m   Estimated body mass index is 33.7 kg/m  as calculated from the following:    Height as of this encounter: 1.803 m (5' 11\").    Weight as of this encounter: 109.6 kg (241 lb 9.6 oz).  Medication Reconciliation: complete    Pamela M. Lechevalier, LPN    "

## 2019-01-09 NOTE — PROGRESS NOTES
SUBJECTIVE:   Toño Gutierrez is a 43 year old male who presents to clinic today for the following health issues:        Hyperlipidemia Follow-Up    Rate your low fat/cholesterol diet?: good    Taking statin?  Yes, no muscle aches from statin    Other lipid medications/supplements?:  Fish oil/Omega 3, dose 1000 mg tid without side effects      Hypertension Follow-up    Outpatient blood pressures are being checked at home.  Results are 130's/80's-90's in 70's a couple times.    Low Salt Diet: no added salt      Depression and Anxiety Follow-Up    Status since last visit: Improved     Other associated symptoms:None    Complicating factors:     Significant life event: No     Current substance abuse: None      PHQ-9  English  PHQ-9   Any Language  AMRIT-7  Suicide Assessment Five-step Evaluation and Treatment (SAFE-T)      PHQ-9 SCORE 3/20/2018 7/11/2018 1/9/2019   PHQ-9 Total Score - - -   PHQ-9 Total Score 1 1 0     AMRIT-7 SCORE 3/20/2018 7/11/2018 1/9/2019   Total Score 2 3 2           Migraine Follow-Up    Headaches symptoms:  Stable     Frequency: once in awhile has not had one in awhile      Duration of headaches: usually last about 3 hours or so    Able to do normal daily activities/work with migraines: No - usually sleeps and has emesis     Rescue/Relief medication:sumatriptan (Imitrex)              Effectiveness: total relief    Preventative medication: None    Neurologic complications: No new stroke-like symptoms, loss of vision or speech, numbness or weakness    In the past 4 weeks, how often have you gone to Urgent Care or the emergency room because of your headaches?  0            Amount of exercise or physical activity: active    Problems taking medications regularly: No    Medication side effects: none    Diet: regular (no restrictions)        Problem list and histories reviewed & adjusted, as indicated.  Additional history: as documented    Patient Active Problem List   Diagnosis     Major depression,  recurrent (H)     ED (erectile dysfunction)     Intractable chronic migraine without aura     Social anxiety disorder     ACP (advance care planning)     Benign essential hypertension     Vitamin D deficiency     Hyperlipidemia LDL goal <100     Taking a statin medication     Past Surgical History:   Procedure Laterality Date     CIRCUMCISION       ORTHOPEDIC SURGERY  2003    rt ruthie teixeira'ed and pinned     ring finger open reduction internal fixation      right       Social History     Tobacco Use     Smoking status: Former Smoker     Packs/day: 0.50     Types: Cigarettes     Smokeless tobacco: Former User     Quit date: 2/13/2014   Substance Use Topics     Alcohol use: Yes     Family History   Problem Relation Age of Onset     Hypertension Maternal Grandmother      Crohn's Disease Sister          Current Outpatient Medications   Medication Sig Dispense Refill     clonazePAM (KLONOPIN) 1 MG tablet Take 1 tablet (1 mg) by mouth 3 times daily as needed for anxiety 90 tablet 5     lisinopril (PRINIVIL/ZESTRIL) 20 MG tablet Take 1 tablet (20 mg) by mouth daily 90 tablet 1     metoprolol succinate ER (TOPROL-XL) 50 MG 24 hr tablet Take 1 tablet (50 mg) by mouth 2 times daily 180 tablet 1     simvastatin (ZOCOR) 40 MG tablet Take 1 tablet (40 mg) by mouth At Bedtime 90 tablet 1     SUMAtriptan (IMITREX) 50 MG tablet Take 1 tablet (50 mg) by mouth at onset of headache for migraine TAKE 1 AT ONSET OF HEADACHE, MAY REPEAT AFTER 2 HR, NOT TO EXEED 200MG DAILY 30 tablet 3     venlafaxine (EFFEXOR) 75 MG tablet Take 1 tablet (75 mg) by mouth 3 times daily 180 tablet 1     aspirin 81 MG EC tablet Take 1 tablet (81 mg) by mouth daily 90 tablet 11     cholecalciferol (VITAMIN D3) 5000 UNITS TABS tablet Take 1 tablet (5,000 Units) by mouth daily 90 tablet 11     Allergies   Allergen Reactions     Erythromycin Base [Kdc:Yellow Dye+Erythromycin+Brilliant Blue Fcf]      Recent Labs   Lab Test 10/24/18  1519 10/24/18  1422  "07/11/18  1041 03/20/18  1044   *  --  173* 296*   HDL 45  --  57 50   TRIG 91  --  194* 186*   * 110* 338*  --    CR 0.86  --  0.81 0.85   GFRESTIMATED >90  --  >90 >90   GFRESTBLACK >90  --  >90 >90   POTASSIUM 4.0  --  4.2 4.2   TSH  --   --  0.70 1.33      BP Readings from Last 3 Encounters:   01/09/19 132/78   07/11/18 122/86   06/06/18 122/90    Wt Readings from Last 3 Encounters:   01/09/19 109.6 kg (241 lb 9.6 oz)   07/11/18 121.6 kg (268 lb)   11/13/17 120.2 kg (265 lb)                  Labs reviewed in EPIC    Reviewed and updated as needed this visit by clinical staff  Tobacco  Allergies  Meds  Problems  Med Hx  Surg Hx  Fam Hx       Reviewed and updated as needed this visit by Provider  Tobacco         ROS:  Constitutional, HEENT, cardiovascular, pulmonary, GI, , musculoskeletal, neuro, skin, endocrine and psych systems are negative, except as otherwise noted.    OBJECTIVE:     /78 (BP Location: Right arm, Patient Position: Chair, Cuff Size: Adult Large)   Pulse 83   Temp 97.1  F (36.2  C) (Tympanic)   Resp 16   Ht 1.803 m (5' 11\")   Wt 109.6 kg (241 lb 9.6 oz)   SpO2 98%   BMI 33.70 kg/m    Body mass index is 33.7 kg/m .     GENERAL: healthy, alert and no distress  EYES: Eyes grossly normal to inspection, PERRL and conjunctivae and sclerae normal  HENT: ear canals and TM's normal, nose and mouth without ulcers or lesions  NECK: no adenopathy, no asymmetry, masses, or scars and thyroid normal to palpation  RESP: lungs clear to auscultation - no rales, rhonchi or wheezes  CV: regular rate and rhythm, normal S1 S2, no S3 or S4, no murmur, click or rub, no peripheral edema and peripheral pulses strong  ABDOMEN: soft, nontender, no hepatosplenomegaly, no masses and bowel sounds normal  MS: no gross musculoskeletal defects noted, no edema  SKIN: no suspicious lesions or rashes        ASSESSMENT/PLAN:     1. Hyperlipidemia LDL goal <100  - Lipid Profile  - Comprehensive " metabolic panel (BMP + Alb, Alk Phos, ALT, AST, Total. Bili, TP)  - simvastatin (ZOCOR) 40 MG tablet; Take 1 tablet (40 mg) by mouth At Bedtime  Dispense: 90 tablet; Refill: 1    2. Taking a statin medication  - Comprehensive metabolic panel (BMP + Alb, Alk Phos, ALT, AST, Total. Bili, TP)    3. Intractable chronic migraine without aura and without status migrainosus  - SUMAtriptan (IMITREX) 50 MG tablet; Take 1 tablet (50 mg) by mouth at onset of headache for migraine TAKE 1 AT ONSET OF HEADACHE, MAY REPEAT AFTER 2 HR, NOT TO EXEED 200MG DAILY  Dispense: 30 tablet; Refill: 3    4. Social anxiety disorder  - TSH with free T4 reflex  - clonazePAM (KLONOPIN) 1 MG tablet; Take 1 tablet (1 mg) by mouth 3 times daily as needed for anxiety  Dispense: 90 tablet; Refill: 5  - venlafaxine (EFFEXOR) 75 MG tablet; Take 1 tablet (75 mg) by mouth 3 times daily  Dispense: 180 tablet; Refill: 1    5. Moderate episode of recurrent major depressive disorder (H)  - TSH with free T4 reflex  - venlafaxine (EFFEXOR) 75 MG tablet; Take 1 tablet (75 mg) by mouth 3 times daily  Dispense: 180 tablet; Refill: 1    6. Vitamin D deficiency  - Vitamin D level  - D3 5000 U OTC    7. Benign essential hypertension  - TSH with free T4 reflex  - Comprehensive metabolic panel (BMP + Alb, Alk Phos, ALT, AST, Total. Bili, TP)  - metoprolol succinate ER (TOPROL-XL) 50 MG 24 hr tablet; Take 1 tablet (50 mg) by mouth 2 times daily  Dispense: 180 tablet; Refill: 1  - lisinopril (PRINIVIL/ZESTRIL) 20 MG tablet; Take 1 tablet (20 mg) by mouth daily  Dispense: 90 tablet; Refill: 1      Follow-up 6 months      Yelitza Roche NP  [facility]

## 2019-01-09 NOTE — PATIENT INSTRUCTIONS
ASSESSMENT/PLAN:     1. Hyperlipidemia LDL goal <100  - Lipid Profile  - Comprehensive metabolic panel (BMP + Alb, Alk Phos, ALT, AST, Total. Bili, TP)  - simvastatin (ZOCOR) 40 MG tablet; Take 1 tablet (40 mg) by mouth At Bedtime  Dispense: 90 tablet; Refill: 1    2. Taking a statin medication  - Comprehensive metabolic panel (BMP + Alb, Alk Phos, ALT, AST, Total. Bili, TP)    3. Intractable chronic migraine without aura and without status migrainosus  - SUMAtriptan (IMITREX) 50 MG tablet; Take 1 tablet (50 mg) by mouth at onset of headache for migraine TAKE 1 AT ONSET OF HEADACHE, MAY REPEAT AFTER 2 HR, NOT TO EXEED 200MG DAILY  Dispense: 30 tablet; Refill: 3    4. Social anxiety disorder  - TSH with free T4 reflex  - clonazePAM (KLONOPIN) 1 MG tablet; Take 1 tablet (1 mg) by mouth 3 times daily as needed for anxiety  Dispense: 90 tablet; Refill: 5  - venlafaxine (EFFEXOR) 75 MG tablet; Take 1 tablet (75 mg) by mouth 3 times daily  Dispense: 180 tablet; Refill: 1    5. Moderate episode of recurrent major depressive disorder (H)  - TSH with free T4 reflex  - venlafaxine (EFFEXOR) 75 MG tablet; Take 1 tablet (75 mg) by mouth 3 times daily  Dispense: 180 tablet; Refill: 1    6. Vitamin D deficiency  - Vitamin D level  - D3 5000 U OTC    7. Benign essential hypertension  - TSH with free T4 reflex  - Comprehensive metabolic panel (BMP + Alb, Alk Phos, ALT, AST, Total. Bili, TP)  - metoprolol succinate ER (TOPROL-XL) 50 MG 24 hr tablet; Take 1 tablet (50 mg) by mouth 2 times daily  Dispense: 180 tablet; Refill: 1  - lisinopril (PRINIVIL/ZESTRIL) 20 MG tablet; Take 1 tablet (20 mg) by mouth daily  Dispense: 90 tablet; Refill: 1      Follow-up 6 months      Yelitza Roche NP  [facility]

## 2019-01-10 LAB — DEPRECATED CALCIDIOL+CALCIFEROL SERPL-MC: 83 UG/L (ref 20–75)

## 2019-01-10 ASSESSMENT — ANXIETY QUESTIONNAIRES: GAD7 TOTAL SCORE: 2

## 2019-01-10 NOTE — RESULT ENCOUNTER NOTE
Hold OTC D3 for a couple months then can resume    [facility]  [phone]

## 2019-05-10 DIAGNOSIS — F33.1 MODERATE EPISODE OF RECURRENT MAJOR DEPRESSIVE DISORDER (H): ICD-10-CM

## 2019-05-10 DIAGNOSIS — F40.10 SOCIAL ANXIETY DISORDER: ICD-10-CM

## 2019-05-10 RX ORDER — VENLAFAXINE 75 MG/1
TABLET ORAL
Qty: 180 TABLET | Refills: 0 | Status: SHIPPED | OUTPATIENT
Start: 2019-05-10 | End: 2019-06-11

## 2019-06-11 DIAGNOSIS — E78.5 HYPERLIPIDEMIA LDL GOAL <100: ICD-10-CM

## 2019-06-11 DIAGNOSIS — I10 BENIGN ESSENTIAL HYPERTENSION: ICD-10-CM

## 2019-06-11 DIAGNOSIS — F40.10 SOCIAL ANXIETY DISORDER: ICD-10-CM

## 2019-06-11 DIAGNOSIS — F33.1 MODERATE EPISODE OF RECURRENT MAJOR DEPRESSIVE DISORDER (H): ICD-10-CM

## 2019-06-11 RX ORDER — SIMVASTATIN 40 MG
40 TABLET ORAL AT BEDTIME
Qty: 90 TABLET | Refills: 1 | Status: SHIPPED | OUTPATIENT
Start: 2019-06-11 | End: 2019-12-15

## 2019-06-11 RX ORDER — VENLAFAXINE 75 MG/1
TABLET ORAL
Qty: 180 TABLET | Refills: 0 | Status: SHIPPED | OUTPATIENT
Start: 2019-06-11 | End: 2019-08-12

## 2019-06-11 RX ORDER — METOPROLOL SUCCINATE 50 MG/1
50 TABLET, EXTENDED RELEASE ORAL 2 TIMES DAILY
Qty: 180 TABLET | Refills: 1 | Status: SHIPPED | OUTPATIENT
Start: 2019-06-11 | End: 2019-12-15

## 2019-06-11 RX ORDER — LISINOPRIL 20 MG/1
20 TABLET ORAL DAILY
Qty: 90 TABLET | Refills: 1 | Status: SHIPPED | OUTPATIENT
Start: 2019-06-11 | End: 2019-12-15

## 2019-08-12 DIAGNOSIS — F40.10 SOCIAL ANXIETY DISORDER: ICD-10-CM

## 2019-08-12 DIAGNOSIS — F33.1 MODERATE EPISODE OF RECURRENT MAJOR DEPRESSIVE DISORDER (H): ICD-10-CM

## 2019-08-13 RX ORDER — CLONAZEPAM 1 MG/1
TABLET ORAL
Qty: 90 TABLET | Refills: 0 | Status: SHIPPED | OUTPATIENT
Start: 2019-08-13 | End: 2019-09-09

## 2019-08-13 RX ORDER — VENLAFAXINE 75 MG/1
TABLET ORAL
Qty: 180 TABLET | Refills: 0 | Status: SHIPPED | OUTPATIENT
Start: 2019-08-13 | End: 2019-11-12

## 2019-08-13 NOTE — TELEPHONE ENCOUNTER
Klonopin      Last Written Prescription Date:  1/09/2019  Last Fill Quantity: 90,   # refills: 5  Last Office Visit: 1/09/2019    Effexor       Last Written Prescription Date:  6/11/2019  Last Fill Quantity: 180,   # refills: 0  Last Office Visit: 1/09/2019  Future Office visit:    Next 5 appointments (look out 90 days)    Aug 20, 2019  9:45 AM CDT  (Arrive by 9:30 AM)  SHORT with Yelitza Roche NP  [facility] ([facility] ) [address]  [facility] [address]  [phone]

## 2019-08-19 NOTE — PROGRESS NOTES
Toño Gutierrez is a 44 year old male who presents to clinic today for the following health issues:        Hyperlipidemia Follow-Up    Are you having any of the following symptoms? (Select all that apply)  No complaints of shortness of breath, chest pain or pressure.  No increased sweating or nausea with activity.  No left-sided neck or arm pain.  No complaints of pain in calves when walking 1-2 blocks.    Are you regularly taking any medication or supplement to lower your cholesterol?   Yes- simvastatin    Are you having muscle aches or other side effects that you think could be caused by your cholesterol lowering medication?  No      Hypertension Follow-up    Do you check your blood pressure regularly outside of the clinic? Yes     Are you following a low salt diet? Yes    Are your blood pressures ever more than 140 on the top number (systolic) OR more   than 90 on the bottom number (diastolic), for example 140/90? No     Depression and Anxiety Follow-Up    How are you doing with your depression since your last visit? Improved     How are you doing with your anxiety since your last visit?  Improved     Are you having other symptoms that might be associated with depression or anxiety? No    Have you had a significant life event? No     Do you have any concerns with your use of alcohol or other drugs? No    Social History     Tobacco Use     Smoking status: Former Smoker     Packs/day: 0.50     Types: Cigarettes     Smokeless tobacco: Former User     Quit date: 2/13/2014   Substance Use Topics     Alcohol use: Yes     Drug use: No     Suicide Assessment Five-step Evaluation and Treatment (SAFE-T)    PHQ-9 SCORE 7/11/2018 1/9/2019 8/20/2019   PHQ-9 Total Score - - -   PHQ-9 Total Score 1 0 0     AMRIT-7 SCORE 7/11/2018 1/9/2019 8/20/2019   Total Score 3 2 4         Migraine     Since your last clinic visit, how have your headaches changed?  Improved    How often are you getting headaches or migraines? 1-2 per year      Are you able to do normal daily activities when you have a migraine? No    Are you taking rescue/relief medications? (Select all that apply) sumatriptan (Imitrex)    How helpful is your rescue/relief medication?  I get total relief    Are you taking any medications to prevent migraines? (Select all that apply)  No    In the past 4 weeks, how often have you gone to urgent care or the emergency room because of your headaches?  0        How many servings of fruits and vegetables do you eat daily?  2-3    On average, how many sweetened beverages do you drink each day (soda, juice, sweet tea, etc)?   0    How many days per week do you miss taking your medication? 0            Patient Active Problem List   Diagnosis     Major depression, recurrent (H)     ED (erectile dysfunction)     Intractable chronic migraine without aura     Social anxiety disorder     ACP (advance care planning)     Benign essential hypertension     Vitamin D deficiency     Hyperlipidemia LDL goal <100     Taking a statin medication     Past Surgical History:   Procedure Laterality Date     CIRCUMCISION       ORTHOPEDIC SURGERY  2003    rt ruthie teixeira'ed and pinned     ring finger open reduction internal fixation      right       Social History     Tobacco Use     Smoking status: Former Smoker     Packs/day: 0.50     Types: Cigarettes     Smokeless tobacco: Former User     Quit date: 2/13/2014   Substance Use Topics     Alcohol use: Yes     Family History   Problem Relation Age of Onset     Hypertension Maternal Grandmother      Crohn's Disease Sister            Current Outpatient Medications   Medication Sig Dispense Refill     aspirin 81 MG EC tablet Take 1 tablet (81 mg) by mouth daily 90 tablet 11     cholecalciferol (VITAMIN D3) 5000 UNITS TABS tablet Take 1 tablet (5,000 Units) by mouth daily 90 tablet 11     clonazePAM (KLONOPIN) 1 MG tablet TAKE 1 TABLET BY MOUTH THREE TIMES DAILY AS NEEDED FOR ANXIETY 90 tablet 0     lisinopril  "(PRINIVIL/ZESTRIL) 20 MG tablet Take 1 tablet (20 mg) by mouth daily 90 tablet 1     metoprolol succinate ER (TOPROL-XL) 50 MG 24 hr tablet Take 1 tablet (50 mg) by mouth 2 times daily 180 tablet 1     simvastatin (ZOCOR) 40 MG tablet Take 1 tablet (40 mg) by mouth At Bedtime 90 tablet 1     SUMAtriptan (IMITREX) 50 MG tablet Take 1 tablet (50 mg) by mouth at onset of headache for migraine TAKE 1 AT ONSET OF HEADACHE, MAY REPEAT AFTER 2 HR, NOT TO EXEED 200MG DAILY 30 tablet 3     venlafaxine (EFFEXOR) 75 MG tablet TAKE 1 TABLET(75 MG) BY MOUTH THREE TIMES DAILY 180 tablet 0       Allergies   Allergen Reactions     Erythromycin Base [Kdc:Yellow Dye+Erythromycin+Brilliant Blue Fcf]        Recent Labs   Lab Test 01/09/19  1005 10/24/18  1519 10/24/18  1422 07/11/18  1041   * 119*  --  173*   HDL 60 45  --  57   TRIG 131 91  --  194*   ALT 85* 113* 110* 338*   CR 0.87 0.86  --  0.81   GFRESTIMATED >90 >90  --  >90   GFRESTBLACK >90 >90  --  >90   POTASSIUM 4.3 4.0  --  4.2   TSH 1.17  --   --  0.70      BP Readings from Last 3 Encounters:   08/20/19 130/78   01/09/19 132/78   07/11/18 122/86    Wt Readings from Last 3 Encounters:   08/20/19 116.3 kg (256 lb 8 oz)   01/09/19 109.6 kg (241 lb 9.6 oz)   07/11/18 121.6 kg (268 lb)            Reviewed and updated as needed this visit by Provider  Tobacco         Review of Systems   ROS COMP: Constitutional, HEENT, cardiovascular, pulmonary, GI, , musculoskeletal, neuro, skin, endocrine and psych systems are negative, except as otherwise noted.        Objective    /78 (BP Location: Right arm, Patient Position: Sitting, Cuff Size: Adult Large)   Pulse 81   Ht 1.803 m (5' 11\")   Wt 116.3 kg (256 lb 8 oz)   SpO2 97%   BMI 35.77 kg/m    Body mass index is 35.77 kg/m .       Physical Exam   GENERAL: healthy, alert and no distress  EYES: Eyes grossly normal to inspection, PERRL and conjunctivae and sclerae normal  HENT: ear canals and TM's normal, nose and " mouth without ulcers or lesions  NECK: no adenopathy, no asymmetry, masses, or scars and thyroid normal to palpation  RESP: lungs clear to auscultation - no rales, rhonchi or wheezes  CV: regular rate and rhythm, normal S1 S2, no S3 or S4, no murmur, click or rub, no peripheral edema and peripheral pulses strong  MS: no gross musculoskeletal defects noted, no edema  SKIN: no suspicious lesions or rashes  PSYCH: mentation appears normal, affect normal/bright            Assessment & Plan     1. Benign essential hypertension  - Comprehensive metabolic panel (BMP + Alb, Alk Phos, ALT, AST, Total. Bili, TP)    2. Hyperlipidemia LDL goal <100  - Lipid Profile  - Comprehensive metabolic panel (BMP + Alb, Alk Phos, ALT, AST, Total. Bili, TP)    3. Taking a statin medication  - Comprehensive metabolic panel (BMP + Alb, Alk Phos, ALT, AST, Total. Bili, TP)    4. Moderate episode of recurrent major depressive disorder (H)  - TSH with free T4 reflex    5. Social anxiety disorder  - TSH with free T4 reflex    6. Migraine without aura and without status migrainosus, not intractable  - Imitrex as needed        Return in about 6 months (around 2/20/2020).         Yelitza Roche NP  [facility]

## 2019-08-20 ENCOUNTER — OFFICE VISIT (OUTPATIENT)
Dept: FAMILY MEDICINE | Facility: OTHER | Age: 44
End: 2019-08-20
Attending: NURSE PRACTITIONER
Payer: COMMERCIAL

## 2019-08-20 VITALS
SYSTOLIC BLOOD PRESSURE: 130 MMHG | WEIGHT: 256.5 LBS | HEIGHT: 71 IN | OXYGEN SATURATION: 97 % | BODY MASS INDEX: 35.91 KG/M2 | HEART RATE: 81 BPM | DIASTOLIC BLOOD PRESSURE: 78 MMHG

## 2019-08-20 DIAGNOSIS — I10 BENIGN ESSENTIAL HYPERTENSION: Primary | ICD-10-CM

## 2019-08-20 DIAGNOSIS — F40.10 SOCIAL ANXIETY DISORDER: ICD-10-CM

## 2019-08-20 DIAGNOSIS — E78.5 HYPERLIPIDEMIA LDL GOAL <100: ICD-10-CM

## 2019-08-20 DIAGNOSIS — F33.1 MODERATE EPISODE OF RECURRENT MAJOR DEPRESSIVE DISORDER (H): ICD-10-CM

## 2019-08-20 DIAGNOSIS — G43.009 MIGRAINE WITHOUT AURA AND WITHOUT STATUS MIGRAINOSUS, NOT INTRACTABLE: ICD-10-CM

## 2019-08-20 DIAGNOSIS — Z79.899 TAKING A STATIN MEDICATION: ICD-10-CM

## 2019-08-20 LAB
ALBUMIN SERPL-MCNC: 4.3 G/DL (ref 3.4–5)
ALP SERPL-CCNC: 100 U/L (ref 40–150)
ALT SERPL W P-5'-P-CCNC: 114 U/L (ref 0–70)
ANION GAP SERPL CALCULATED.3IONS-SCNC: 6 MMOL/L (ref 3–14)
AST SERPL W P-5'-P-CCNC: 58 U/L (ref 0–45)
BILIRUB SERPL-MCNC: 0.7 MG/DL (ref 0.2–1.3)
BUN SERPL-MCNC: 14 MG/DL (ref 7–30)
CALCIUM SERPL-MCNC: 9.2 MG/DL (ref 8.5–10.1)
CHLORIDE SERPL-SCNC: 106 MMOL/L (ref 94–109)
CHOLEST SERPL-MCNC: 230 MG/DL
CO2 SERPL-SCNC: 26 MMOL/L (ref 20–32)
CREAT SERPL-MCNC: 0.81 MG/DL (ref 0.66–1.25)
GFR SERPL CREATININE-BSD FRML MDRD: >90 ML/MIN/{1.73_M2}
GLUCOSE SERPL-MCNC: 109 MG/DL (ref 70–99)
HDLC SERPL-MCNC: 64 MG/DL
LDLC SERPL CALC-MCNC: 140 MG/DL
NONHDLC SERPL-MCNC: 166 MG/DL
POTASSIUM SERPL-SCNC: 4.3 MMOL/L (ref 3.4–5.3)
PROT SERPL-MCNC: 8.1 G/DL (ref 6.8–8.8)
SODIUM SERPL-SCNC: 138 MMOL/L (ref 133–144)
TRIGL SERPL-MCNC: 131 MG/DL
TSH SERPL DL<=0.005 MIU/L-ACNC: 1.07 MU/L (ref 0.4–4)

## 2019-08-20 PROCEDURE — 84443 ASSAY THYROID STIM HORMONE: CPT | Mod: ZL | Performed by: NURSE PRACTITIONER

## 2019-08-20 PROCEDURE — 80053 COMPREHEN METABOLIC PANEL: CPT | Mod: ZL | Performed by: NURSE PRACTITIONER

## 2019-08-20 PROCEDURE — 99214 OFFICE O/P EST MOD 30 MIN: CPT | Performed by: NURSE PRACTITIONER

## 2019-08-20 PROCEDURE — 80061 LIPID PANEL: CPT | Mod: ZL | Performed by: NURSE PRACTITIONER

## 2019-08-20 PROCEDURE — 36415 COLL VENOUS BLD VENIPUNCTURE: CPT | Mod: ZL | Performed by: NURSE PRACTITIONER

## 2019-08-20 PROCEDURE — G0463 HOSPITAL OUTPT CLINIC VISIT: HCPCS

## 2019-08-20 ASSESSMENT — ANXIETY QUESTIONNAIRES
IF YOU CHECKED OFF ANY PROBLEMS ON THIS QUESTIONNAIRE, HOW DIFFICULT HAVE THESE PROBLEMS MADE IT FOR YOU TO DO YOUR WORK, TAKE CARE OF THINGS AT HOME, OR GET ALONG WITH OTHER PEOPLE: SOMEWHAT DIFFICULT
5. BEING SO RESTLESS THAT IT IS HARD TO SIT STILL: NOT AT ALL
2. NOT BEING ABLE TO STOP OR CONTROL WORRYING: SEVERAL DAYS
1. FEELING NERVOUS, ANXIOUS, OR ON EDGE: SEVERAL DAYS
6. BECOMING EASILY ANNOYED OR IRRITABLE: NOT AT ALL
7. FEELING AFRAID AS IF SOMETHING AWFUL MIGHT HAPPEN: SEVERAL DAYS
GAD7 TOTAL SCORE: 4
3. WORRYING TOO MUCH ABOUT DIFFERENT THINGS: SEVERAL DAYS
4. TROUBLE RELAXING: NOT AT ALL

## 2019-08-20 ASSESSMENT — PAIN SCALES - GENERAL: PAINLEVEL: NO PAIN (0)

## 2019-08-20 ASSESSMENT — PATIENT HEALTH QUESTIONNAIRE - PHQ9: SUM OF ALL RESPONSES TO PHQ QUESTIONS 1-9: 0

## 2019-08-20 ASSESSMENT — MIFFLIN-ST. JEOR: SCORE: 2085.61

## 2019-08-20 NOTE — LETTER
My Depression Action Plan  Name: Toño Gutierrez   Date of Birth 1975  Date: 8/20/2019    My doctor: Yelitza Roche   My clinic: [facility]  [address]  [place] [address]  [phone]          GREEN    ZONE   Good Control    What it looks like:     Things are going generally well. You have normal up s and down s. You may even feel depressed from time to time, but bad moods usually last less than a day.   What you need to do:  1. Continue to care for yourself (see self care plan)  2. Check your depression survival kit and update it as needed  3. Follow your physician s recommendations including any medication.  4. Do not stop taking medication unless you consult with your physician first.           YELLOW         ZONE Getting Worse    What it looks like:     Depression is starting to interfere with your life.     It may be hard to get out of bed; you may be starting to isolate yourself from others.    Symptoms of depression are starting to last most all day and this has happened for several days.     You may have suicidal thoughts but they are not constant.   What you need to do:     1. Call your care team, your response to treatment will improve if you keep your care team informed of your progress. Yellow periods are signs an adjustment may need to be made.     2. Continue your self-care, even if you have to fake it!    3. Talk to someone in your support network    4. Open up your depression survival kit           RED    ZONE Medical Alert - Get Help    What it looks like:     Depression is seriously interfering with your life.     You may experience these or other symptoms: You can t get out of bed most days, can t work or engage in other necessary activities, you have trouble taking care of basic hygiene, or basic responsibilities, thoughts of suicide or death that will not go away, self-injurious behavior.     What you need to do:  1. Call your care team  and request a same-day appointment. If they are not available (weekends or after hours) call your local crisis line, emergency room or 911.            Depression Self Care Plan / Survival Kit    Self-Care for Depression  Here s the deal. Your body and mind are really not as separate as most people think.  What you do and think affects how you feel and how you feel influences what you do and think. This means if you do things that people who feel good do, it will help you feel better.  Sometimes this is all it takes.  There is also a place for medication and therapy depending on how severe your depression is, so be sure to consult with your medical provider and/ or Behavioral Health Consultant if your symptoms are worsening or not improving.     In order to better manage my stress, I will:    Exercise  Get some form of exercise, every day. This will help reduce pain and release endorphins, the  feel good  chemicals in your brain. This is almost as good as taking antidepressants!  This is not the same as joining a gym and then never going! (they count on that by the way ) It can be as simple as just going for a walk or doing some gardening, anything that will get you moving.      Hygiene   Maintain good hygiene (Get out of bed in the morning, Make your bed, Brush your teeth, Take a shower, and Get dressed like you were going to work, even if you are unemployed).  If your clothes don't fit try to get ones that do.    Diet  I will strive to eat foods that are good for me, drink plenty of water, and avoid excessive sugar, caffeine, alcohol, and other mood-altering substances.  Some foods that are helpful in depression are: complex carbohydrates, B vitamins, flaxseed, fish or fish oil, fresh fruits and vegetables.    Psychotherapy  I agree to participate in Individual Therapy (if recommended).    Medication  If prescribed medications, I agree to take them.  Missing doses can result in serious side effects.  I understand  that drinking alcohol, or other illicit drug use, may cause potential side effects.  I will not stop my medication abruptly without first discussing it with my provider.    Staying Connected With Others  I will stay in touch with my friends, family members, and my primary care provider/team.    Use your imagination  Be creative.  We all have a creative side; it doesn t matter if it s oil painting, sand castles, or mud pies! This will also kick up the endorphins.    Witness Beauty  (AKA stop and smell the roses) Take a look outside, even in mid-winter. Notice colors, textures. Watch the squirrels and birds.     Service to others  Be of service to others.  There is always someone else in need.  By helping others we can  get out of ourselves  and remember the really important things.  This also provides opportunities for practicing all the other parts of the program.    Humor  Laugh and be silly!  Adjust your TV habits for less news and crime-drama and more comedy.    Control your stress  Try breathing deep, massage therapy, biofeedback, and meditation. Find time to relax each day.     My support system    Clinic Contact:  Phone number:    Contact 1:  Phone number:    Contact 2:  Phone number:    Sabianism/:  Phone number:    Therapist:  Phone number:    Local crisis center:    Phone number:    Other community support:  Phone number:

## 2019-08-20 NOTE — PATIENT INSTRUCTIONS
Assessment & Plan     1. Benign essential hypertension  - Comprehensive metabolic panel (BMP + Alb, Alk Phos, ALT, AST, Total. Bili, TP)    2. Hyperlipidemia LDL goal <100  - Lipid Profile  - Comprehensive metabolic panel (BMP + Alb, Alk Phos, ALT, AST, Total. Bili, TP)    3. Taking a statin medication  - Comprehensive metabolic panel (BMP + Alb, Alk Phos, ALT, AST, Total. Bili, TP)    4. Moderate episode of recurrent major depressive disorder (H)  - TSH with free T4 reflex    5. Social anxiety disorder  - TSH with free T4 reflex    6. Migraine without aura and without status migrainosus, not intractable  - Imitrex as needed        Return in about 6 months (around 2/20/2020).         Yelitza Roche NP  [facility]

## 2019-08-20 NOTE — NURSING NOTE
"Chief Complaint   Patient presents with     Lipids     Hypertension     Depression       Initial /84 (BP Location: Right arm, Patient Position: Sitting, Cuff Size: Adult Large)   Pulse 81   Ht 1.803 m (5' 11\")   Wt 116.3 kg (256 lb 8 oz)   SpO2 97%   BMI 35.77 kg/m   Estimated body mass index is 35.77 kg/m  as calculated from the following:    Height as of this encounter: 1.803 m (5' 11\").    Weight as of this encounter: 116.3 kg (256 lb 8 oz).  Medication Reconciliation: complete  "

## 2019-08-20 NOTE — RESULT ENCOUNTER NOTE
Overall stable labs  Continue to work on low fat diet  Follow-up 6 months      The 10-year ASCVD risk score (Chio PATRICIA Jr., et al., 2013) is: 1.6%    Values used to calculate the score:      Age: 44 years      Sex: Male      Is Non- : No      Diabetic: No      Tobacco smoker: No      Systolic Blood Pressure: 130 mmHg      Is BP treated: Yes      HDL Cholesterol: 64 mg/dL      Total Cholesterol: 230 mg/dL

## 2019-08-21 ASSESSMENT — ANXIETY QUESTIONNAIRES: GAD7 TOTAL SCORE: 4

## 2019-09-09 DIAGNOSIS — F40.10 SOCIAL ANXIETY DISORDER: ICD-10-CM

## 2019-09-10 RX ORDER — CLONAZEPAM 1 MG/1
TABLET ORAL
Qty: 90 TABLET | Refills: 0 | Status: SHIPPED | OUTPATIENT
Start: 2019-09-10 | End: 2019-10-12

## 2019-09-10 NOTE — TELEPHONE ENCOUNTER
clonazePAM (KLONOPIN) 1 MG tablet  Last Written Prescription Date:  8/13/2019  Last Fill Quantity: 90,   # refills: 0  Last Office Visit: 8/20/2019  Future Office visit:       Routing refill request to provider for review/approval because:  Drug not on the [facility], [facility] or [facility] refill protocol or controlled substance

## 2019-10-12 DIAGNOSIS — F40.10 SOCIAL ANXIETY DISORDER: ICD-10-CM

## 2019-10-14 NOTE — TELEPHONE ENCOUNTER
Klonopin  Last Written Prescription Date: 9/10/19  Last Fill Quantity: 90 # of Refills: 0  Last Office Visit: 8/20/19

## 2019-10-15 RX ORDER — CLONAZEPAM 1 MG/1
TABLET ORAL
Qty: 90 TABLET | Refills: 0 | Status: SHIPPED | OUTPATIENT
Start: 2019-10-15 | End: 2019-11-12

## 2019-11-12 DIAGNOSIS — F33.1 MODERATE EPISODE OF RECURRENT MAJOR DEPRESSIVE DISORDER (H): ICD-10-CM

## 2019-11-12 DIAGNOSIS — F40.10 SOCIAL ANXIETY DISORDER: ICD-10-CM

## 2019-11-12 RX ORDER — VENLAFAXINE 75 MG/1
TABLET ORAL
Qty: 180 TABLET | Refills: 0 | Status: SHIPPED | OUTPATIENT
Start: 2019-11-12 | End: 2020-01-20

## 2019-11-12 RX ORDER — CLONAZEPAM 1 MG/1
TABLET ORAL
Qty: 90 TABLET | Refills: 0 | Status: SHIPPED | OUTPATIENT
Start: 2019-11-12 | End: 2019-12-15

## 2019-11-12 NOTE — TELEPHONE ENCOUNTER
clonazePAM (KLONOPIN) 1 MG tablet  Last Written Prescription Date:  10/15/2019  Last Fill Quantity: 90,   # refills: 0  Last Office Visit: 8/20/2019  Future Office visit:       Routing refill request to provider for review/approval because:  Drug not on the [facility], [facility] or [facility] refill protocol or controlled substance

## 2019-12-13 NOTE — PROGRESS NOTES
Toño Gutierrez is a 44 year old male who presents to clinic today for the following health issues:        Hyperlipidemia Follow-Up    Are you regularly taking any medication or supplement to lower your cholesterol?   Yes- zocor    Are you having muscle aches or other side effects that you think could be caused by your cholesterol lowering medication?  No      Hypertension Follow-up    Do you check your blood pressure regularly outside of the clinic? Yes     Are you following a low salt diet? Yes    Are your blood pressures ever more than 140 on the top number (systolic) OR more   than 90 on the bottom number (diastolic), for example 140/90? No      Depression and Anxiety Follow-Up    How are you doing with your depression since your last visit? stable    How are you doing with your anxiety since your last visit?  stable    Are you having other symptoms that might be associated with depression or anxiety? No    Have you had a significant life event? No     Do you have any concerns with your use of alcohol or other drugs? No      PHQ-9 SCORE 1/9/2019 8/20/2019 12/16/2019   PHQ-9 Total Score - - -   PHQ-9 Total Score 0 0 0     AMRIT-7 SCORE 1/9/2019 8/20/2019 12/16/2019   Total Score 2 4 3           Social History     Tobacco Use     Smoking status: Former Smoker     Packs/day: 0.50     Types: Cigarettes     Smokeless tobacco: Former User     Quit date: 2/13/2014   Substance Use Topics     Alcohol use: Yes     Drug use: No     Suicide Assessment Five-step Evaluation and Treatment (SAFE-T)          How many servings of fruits and vegetables do you eat daily?  2-3    On average, how many sweetened beverages do you drink each day (Examples: soda, juice, sweet tea, etc.  Do NOT count diet or artificially sweetened beverages)?   0    How many days per week do you miss taking your medication? 0      Migraine     Since your last clinic visit, how have your headaches changed?  Improved    How often are you getting headaches  or migraines? Reports not having for quiet ahile     Are you able to do normal daily activities when you have a migraine? NO    Are you taking rescue/relief medications? (Select all that apply) sumatriptan (Imitrex)    How helpful is your rescue/relief medication?  I get total relief    Are you taking any medications to prevent migraines? (Select all that apply)  No    In the past 4 weeks, how often have you gone to urgent care or the emergency room because of your headaches?  0       Rash    Duration: months    Description  Location: armpits  Itching: no    Intensity:  moderate    Accompanying signs and symptoms: None    History (similar episodes/previous evaluation): None    Precipitating or alleviating factors:  New exposures:  None  Recent travel: no      Therapies tried and outcome: none           Patient Active Problem List   Diagnosis     Major depression, recurrent (H)     ED (erectile dysfunction)     Intractable chronic migraine without aura     Social anxiety disorder     ACP (advance care planning)     Benign essential hypertension     Vitamin D deficiency     Hyperlipidemia LDL goal <100     Taking a statin medication     Obesity (BMI 35.0-39.9) with comorbidity (H)     Past Surgical History:   Procedure Laterality Date     CIRCUMCISION       ORTHOPEDIC SURGERY  2003    rt aurelio teixeiraed and pinned     ring finger open reduction internal fixation      right       Social History     Tobacco Use     Smoking status: Former Smoker     Packs/day: 0.50     Types: Cigarettes     Smokeless tobacco: Former User     Quit date: 2/13/2014   Substance Use Topics     Alcohol use: Yes     Family History   Problem Relation Age of Onset     Hypertension Maternal Grandmother      Crohn's Disease Sister            Current Outpatient Medications   Medication Sig Dispense Refill     aspirin 81 MG EC tablet Take 1 tablet (81 mg) by mouth daily 90 tablet 11     cholecalciferol (VITAMIN D3) 5000 UNITS TABS tablet Take 1  tablet (5,000 Units) by mouth daily 90 tablet 11     clonazePAM (KLONOPIN) 1 MG tablet TAKE 1 TABLET BY MOUTH THREE TIMES DAILY AS NEEDED FOR ANXIETY 90 tablet 5     lisinopril (PRINIVIL/ZESTRIL) 20 MG tablet Take 1 tablet (20 mg) by mouth daily 90 tablet 3     metoprolol succinate ER (TOPROL-XL) 50 MG 24 hr tablet Take 1 tablet (50 mg) by mouth 2 times daily 180 tablet 3     nystatin (MYCOSTATIN) 067724 UNIT/GM external cream Apply topically 2 times daily Mix with Triamcinolone 60 g 1     simvastatin (ZOCOR) 40 MG tablet Take 1 tablet (40 mg) by mouth At Bedtime 90 tablet 3     SUMAtriptan (IMITREX) 50 MG tablet Take 1 tablet (50 mg) by mouth at onset of headache for migraine TAKE 1 AT ONSET OF HEADACHE, MAY REPEAT AFTER 2 HR, NOT TO EXEED 200MG DAILY 30 tablet 3     triamcinolone (KENALOG) 0.1 % external cream Apply topically 2 times daily Mix with Nystatin 60 g 1     venlafaxine (EFFEXOR) 75 MG tablet TAKE 1 TABLET(75 MG) BY MOUTH THREE TIMES DAILY 180 tablet 0         Allergies   Allergen Reactions     Erythromycin Base [Kdc:Yellow Dye+Erythromycin+Brilliant Blue Fcf]          Recent Labs   Lab Test 08/20/19  0934 01/09/19  1005 10/24/18  1519   * 111* 119*   HDL 64 60 45   TRIG 131 131 91   * 85* 113*   CR 0.81 0.87 0.86   GFRESTIMATED >90 >90 >90   GFRESTBLACK >90 >90 >90   POTASSIUM 4.3 4.3 4.0   TSH 1.07 1.17  --           BP Readings from Last 3 Encounters:   12/16/19 128/78   08/20/19 130/78   01/09/19 132/78    Wt Readings from Last 3 Encounters:   12/16/19 119.7 kg (264 lb)   08/20/19 116.3 kg (256 lb 8 oz)   01/09/19 109.6 kg (241 lb 9.6 oz)                  Reviewed and updated as needed this visit by Provider         Review of Systems   ROS COMP: Constitutional, HEENT, cardiovascular, pulmonary, GI, , musculoskeletal, neuro, skin, endocrine and psych systems are negative, except as otherwise noted.          Objective    /78 (BP Location: Right arm, Patient Position: Sitting, Cuff  "Size: Adult Large)   Pulse 80   Temp 98  F (36.7  C) (Tympanic)   Resp 14   Ht 1.803 m (5' 11\")   Wt 119.7 kg (264 lb)   SpO2 96%   BMI 36.82 kg/m    Body mass index is 36.82 kg/m .       Physical Exam   GENERAL: healthy, alert and no distress  EYES: Eyes grossly normal to inspection, PERRL and conjunctivae and sclerae normal  HENT: ear canals and TM's normal, nose and mouth without ulcers or lesions  NECK: no adenopathy, no asymmetry, masses, or scars and thyroid normal to palpation  RESP: lungs clear to auscultation - no rales, rhonchi or wheezes  CV: regular rate and rhythm, normal S1 S2, no S3 or S4, no murmur, click or rub, no peripheral edema and peripheral pulses strong  MS: no gross musculoskeletal defects noted, no edema  SKIN: patchy rash - bilateral axilla, waistline - appears fungal  PSYCH: mentation appears normal, affect normal/bright          Visit time:     Over 40  minutes  spent with the patient.   Greater than 50% of our visit time was spent face to face and included patient education and counseling regarding current conditions, and disease management.      Medication management, and plan of care.    HTN - Discussed diet, exercise, compliance  Hyperlipidemia - Discussed dietary modification, exercise, compliance  Migraine management - plan reviewed  Anxiety and depression - Plan reviewed    Visit Content:   Any referrals made are noted in AVS, and referrals have been reviewed with the patient.  Relevant diagnostic testing discussed  Reviewed appropriate outside records  Lab testing discussed  Any medication adjustment has been reviewed, and outlined in plan of care /  AVS    Health Maintenance   Updated as appropriate.    Record review completed     Detailed plan of care is provided, AVS printed          Assessment & Plan     1. Benign essential hypertension  - lisinopril (PRINIVIL/ZESTRIL) 20 MG tablet; Take 1 tablet (20 mg) by mouth daily  Dispense: 90 tablet; Refill: 3  - metoprolol " succinate ER (TOPROL-XL) 50 MG 24 hr tablet; Take 1 tablet (50 mg) by mouth 2 times daily  Dispense: 180 tablet; Refill: 3  - Comprehensive metabolic panel (BMP + Alb, Alk Phos, ALT, AST, Total. Bili, TP)  - TSH with free T4 reflex    2. Hyperlipidemia LDL goal <100  - simvastatin (ZOCOR) 40 MG tablet; Take 1 tablet (40 mg) by mouth At Bedtime  Dispense: 90 tablet; Refill: 3  - Lipid Profile  - Comprehensive metabolic panel (BMP + Alb, Alk Phos, ALT, AST, Total. Bili, TP)    3. Taking a statin medication  - Comprehensive metabolic panel (BMP + Alb, Alk Phos, ALT, AST, Total. Bili, TP)    4. Intractable chronic migraine without aura and without status migrainosus  - SUMAtriptan (IMITREX) 50 MG tablet; Take 1 tablet (50 mg) by mouth at onset of headache for migraine TAKE 1 AT ONSET OF HEADACHE, MAY REPEAT AFTER 2 HR, NOT TO EXEED 200MG DAILY  Dispense: 30 tablet; Refill: 3    5. Moderate episode of recurrent major depressive disorder (H)  - See attached action plan    6. Social anxiety disorder  - clonazePAM (KLONOPIN) 1 MG tablet; TAKE 1 TABLET BY MOUTH THREE TIMES DAILY AS NEEDED FOR ANXIETY  Dispense: 90 tablet; Refill: 5    7. Candidiasis of skin  - triamcinolone (KENALOG) 0.1 % external cream; Apply topically 2 times daily Mix with Nystatin  Dispense: 60 g; Refill: 1  - nystatin (MYCOSTATIN) 507578 UNIT/GM external cream; Apply topically 2 times daily Mix with Triamcinolone  Dispense: 60 g; Refill: 1      Follow-up if unimproved        Return in about 6 months (around 6/16/2020).         Yelitza Roche, CNP  [facility]

## 2019-12-16 ENCOUNTER — OFFICE VISIT (OUTPATIENT)
Dept: FAMILY MEDICINE | Facility: OTHER | Age: 44
End: 2019-12-16
Attending: NURSE PRACTITIONER
Payer: COMMERCIAL

## 2019-12-16 VITALS
TEMPERATURE: 98 F | RESPIRATION RATE: 14 BRPM | WEIGHT: 264 LBS | BODY MASS INDEX: 36.96 KG/M2 | DIASTOLIC BLOOD PRESSURE: 78 MMHG | HEIGHT: 71 IN | OXYGEN SATURATION: 96 % | SYSTOLIC BLOOD PRESSURE: 128 MMHG | HEART RATE: 80 BPM

## 2019-12-16 DIAGNOSIS — R73.09 ELEVATED GLUCOSE: Primary | ICD-10-CM

## 2019-12-16 DIAGNOSIS — B37.2 CANDIDIASIS OF SKIN: ICD-10-CM

## 2019-12-16 DIAGNOSIS — E78.5 HYPERLIPIDEMIA LDL GOAL <100: ICD-10-CM

## 2019-12-16 DIAGNOSIS — G43.719 INTRACTABLE CHRONIC MIGRAINE WITHOUT AURA AND WITHOUT STATUS MIGRAINOSUS: ICD-10-CM

## 2019-12-16 DIAGNOSIS — Z79.899 TAKING A STATIN MEDICATION: ICD-10-CM

## 2019-12-16 DIAGNOSIS — I10 BENIGN ESSENTIAL HYPERTENSION: ICD-10-CM

## 2019-12-16 DIAGNOSIS — F40.10 SOCIAL ANXIETY DISORDER: ICD-10-CM

## 2019-12-16 DIAGNOSIS — F33.1 MODERATE EPISODE OF RECURRENT MAJOR DEPRESSIVE DISORDER (H): ICD-10-CM

## 2019-12-16 DIAGNOSIS — E78.5 HYPERLIPIDEMIA LDL GOAL <100: Primary | ICD-10-CM

## 2019-12-16 PROBLEM — E66.01 MORBID OBESITY (H): Status: ACTIVE | Noted: 2019-12-16

## 2019-12-16 LAB
ALBUMIN SERPL-MCNC: 4.3 G/DL (ref 3.4–5)
ALP SERPL-CCNC: 110 U/L (ref 40–150)
ALT SERPL W P-5'-P-CCNC: 168 U/L (ref 0–70)
ANION GAP SERPL CALCULATED.3IONS-SCNC: 9 MMOL/L (ref 3–14)
AST SERPL W P-5'-P-CCNC: 78 U/L (ref 0–45)
BILIRUB SERPL-MCNC: 0.7 MG/DL (ref 0.2–1.3)
BUN SERPL-MCNC: 14 MG/DL (ref 7–30)
CALCIUM SERPL-MCNC: 9.5 MG/DL (ref 8.5–10.1)
CHLORIDE SERPL-SCNC: 102 MMOL/L (ref 94–109)
CHOLEST SERPL-MCNC: 242 MG/DL
CO2 SERPL-SCNC: 25 MMOL/L (ref 20–32)
CREAT SERPL-MCNC: 0.85 MG/DL (ref 0.66–1.25)
EST. AVERAGE GLUCOSE BLD GHB EST-MCNC: 114 MG/DL
GFR SERPL CREATININE-BSD FRML MDRD: >90 ML/MIN/{1.73_M2}
GLUCOSE SERPL-MCNC: 111 MG/DL (ref 70–99)
HBA1C MFR BLD: 5.6 % (ref 0–5.6)
HDLC SERPL-MCNC: 65 MG/DL
LDLC SERPL CALC-MCNC: 138 MG/DL
NONHDLC SERPL-MCNC: 177 MG/DL
POTASSIUM SERPL-SCNC: 4.1 MMOL/L (ref 3.4–5.3)
PROT SERPL-MCNC: 8.3 G/DL (ref 6.8–8.8)
SODIUM SERPL-SCNC: 136 MMOL/L (ref 133–144)
TRIGL SERPL-MCNC: 195 MG/DL
TSH SERPL DL<=0.005 MIU/L-ACNC: 1.26 MU/L (ref 0.4–4)

## 2019-12-16 PROCEDURE — 36415 COLL VENOUS BLD VENIPUNCTURE: CPT | Mod: ZL | Performed by: NURSE PRACTITIONER

## 2019-12-16 PROCEDURE — 83036 HEMOGLOBIN GLYCOSYLATED A1C: CPT | Mod: ZL | Performed by: NURSE PRACTITIONER

## 2019-12-16 PROCEDURE — 80061 LIPID PANEL: CPT | Mod: ZL | Performed by: NURSE PRACTITIONER

## 2019-12-16 PROCEDURE — 90471 IMMUNIZATION ADMIN: CPT | Performed by: NURSE PRACTITIONER

## 2019-12-16 PROCEDURE — 80053 COMPREHEN METABOLIC PANEL: CPT | Mod: ZL | Performed by: NURSE PRACTITIONER

## 2019-12-16 PROCEDURE — 99215 OFFICE O/P EST HI 40 MIN: CPT | Performed by: NURSE PRACTITIONER

## 2019-12-16 PROCEDURE — G0463 HOSPITAL OUTPT CLINIC VISIT: HCPCS

## 2019-12-16 PROCEDURE — 84443 ASSAY THYROID STIM HORMONE: CPT | Mod: ZL | Performed by: NURSE PRACTITIONER

## 2019-12-16 PROCEDURE — 90686 IIV4 VACC NO PRSV 0.5 ML IM: CPT

## 2019-12-16 PROCEDURE — 40000788 ZZHCL STATISTIC ESTIMATED AVERAGE GLUCOSE: Mod: ZL | Performed by: NURSE PRACTITIONER

## 2019-12-16 PROCEDURE — G0463 HOSPITAL OUTPT CLINIC VISIT: HCPCS | Mod: 25

## 2019-12-16 RX ORDER — SUMATRIPTAN 50 MG/1
50 TABLET, FILM COATED ORAL
Qty: 30 TABLET | Refills: 3 | Status: SHIPPED | OUTPATIENT
Start: 2019-12-16 | End: 2020-06-16

## 2019-12-16 RX ORDER — LISINOPRIL 20 MG/1
20 TABLET ORAL DAILY
Qty: 90 TABLET | Refills: 3 | Status: SHIPPED | OUTPATIENT
Start: 2019-12-16 | End: 2020-06-16

## 2019-12-16 RX ORDER — SIMVASTATIN 40 MG
40 TABLET ORAL AT BEDTIME
Qty: 90 TABLET | Refills: 3 | Status: SHIPPED | OUTPATIENT
Start: 2019-12-16 | End: 2019-12-16

## 2019-12-16 RX ORDER — TRIAMCINOLONE ACETONIDE 1 MG/G
CREAM TOPICAL 2 TIMES DAILY
Qty: 60 G | Refills: 1 | Status: SHIPPED | OUTPATIENT
Start: 2019-12-16 | End: 2020-05-05

## 2019-12-16 RX ORDER — SIMVASTATIN 40 MG
TABLET ORAL
Qty: 90 TABLET | Refills: 3 | Status: SHIPPED | OUTPATIENT
Start: 2019-12-16 | End: 2020-06-16

## 2019-12-16 RX ORDER — CLONAZEPAM 1 MG/1
TABLET ORAL
Qty: 90 TABLET | Refills: 5 | Status: SHIPPED | OUTPATIENT
Start: 2019-12-16 | End: 2020-06-16

## 2019-12-16 RX ORDER — METOPROLOL SUCCINATE 50 MG/1
50 TABLET, EXTENDED RELEASE ORAL 2 TIMES DAILY
Qty: 180 TABLET | Refills: 3 | Status: SHIPPED | OUTPATIENT
Start: 2019-12-16 | End: 2020-06-16

## 2019-12-16 RX ORDER — NYSTATIN 100000 U/G
CREAM TOPICAL 2 TIMES DAILY
Qty: 60 G | Refills: 1 | Status: SHIPPED | OUTPATIENT
Start: 2019-12-16 | End: 2020-05-05

## 2019-12-16 ASSESSMENT — PATIENT HEALTH QUESTIONNAIRE - PHQ9: SUM OF ALL RESPONSES TO PHQ QUESTIONS 1-9: 0

## 2019-12-16 ASSESSMENT — ANXIETY QUESTIONNAIRES
1. FEELING NERVOUS, ANXIOUS, OR ON EDGE: SEVERAL DAYS
IF YOU CHECKED OFF ANY PROBLEMS ON THIS QUESTIONNAIRE, HOW DIFFICULT HAVE THESE PROBLEMS MADE IT FOR YOU TO DO YOUR WORK, TAKE CARE OF THINGS AT HOME, OR GET ALONG WITH OTHER PEOPLE: NOT DIFFICULT AT ALL
5. BEING SO RESTLESS THAT IT IS HARD TO SIT STILL: NOT AT ALL
6. BECOMING EASILY ANNOYED OR IRRITABLE: NOT AT ALL
4. TROUBLE RELAXING: NOT AT ALL
3. WORRYING TOO MUCH ABOUT DIFFERENT THINGS: SEVERAL DAYS
7. FEELING AFRAID AS IF SOMETHING AWFUL MIGHT HAPPEN: SEVERAL DAYS
GAD7 TOTAL SCORE: 3
2. NOT BEING ABLE TO STOP OR CONTROL WORRYING: NOT AT ALL

## 2019-12-16 ASSESSMENT — PAIN SCALES - GENERAL: PAINLEVEL: NO PAIN (0)

## 2019-12-16 ASSESSMENT — MIFFLIN-ST. JEOR: SCORE: 2119.63

## 2019-12-16 NOTE — PATIENT INSTRUCTIONS
Assessment & Plan     1. Benign essential hypertension  - lisinopril (PRINIVIL/ZESTRIL) 20 MG tablet; Take 1 tablet (20 mg) by mouth daily  Dispense: 90 tablet; Refill: 3  - metoprolol succinate ER (TOPROL-XL) 50 MG 24 hr tablet; Take 1 tablet (50 mg) by mouth 2 times daily  Dispense: 180 tablet; Refill: 3  - Comprehensive metabolic panel (BMP + Alb, Alk Phos, ALT, AST, Total. Bili, TP)  - TSH with free T4 reflex    2. Hyperlipidemia LDL goal <100  - simvastatin (ZOCOR) 40 MG tablet; Take 1 tablet (40 mg) by mouth At Bedtime  Dispense: 90 tablet; Refill: 3  - Lipid Profile  - Comprehensive metabolic panel (BMP + Alb, Alk Phos, ALT, AST, Total. Bili, TP)    3. Taking a statin medication  - Comprehensive metabolic panel (BMP + Alb, Alk Phos, ALT, AST, Total. Bili, TP)    4. Intractable chronic migraine without aura and without status migrainosus  - SUMAtriptan (IMITREX) 50 MG tablet; Take 1 tablet (50 mg) by mouth at onset of headache for migraine TAKE 1 AT ONSET OF HEADACHE, MAY REPEAT AFTER 2 HR, NOT TO EXEED 200MG DAILY  Dispense: 30 tablet; Refill: 3    5. Moderate episode of recurrent major depressive disorder (H)  - See attached action plan    6. Social anxiety disorder  - clonazePAM (KLONOPIN) 1 MG tablet; TAKE 1 TABLET BY MOUTH THREE TIMES DAILY AS NEEDED FOR ANXIETY  Dispense: 90 tablet; Refill: 5    7. Candidiasis of skin  - triamcinolone (KENALOG) 0.1 % external cream; Apply topically 2 times daily Mix with Nystatin  Dispense: 60 g; Refill: 1  - nystatin (MYCOSTATIN) 548783 UNIT/GM external cream; Apply topically 2 times daily Mix with Triamcinolone  Dispense: 60 g; Refill: 1      Follow-up if unimproved        Return in about 6 months (around 6/16/2020).       Yelitza Roche CNP  [facility]

## 2019-12-16 NOTE — LETTER
My Migraine Action Plan      Date: 12/16/2019     My Name: Toño Gutierrez   YOB: 1975  My Pharmacy: [id number] - [place], [place] - [address]  AT [facility] [address]     My Preventive Medicine(s):  none  My Rescue Medicine(s):  Sumatriptan (Imitrex) as directed My Doctor: Yelitza Roche     My Clinic: [facility]  [address] [place] [address]  [facility] [address]  [phone]        GREEN ZONE = Good Control    My headache plan is working.   I can do what I need to do.         I WILL:     ? Keep managing my triggers.  ? Write in my migraine diary each time I have a headache.  ? Keep taking my preventive medicine daily.  ? Take my rescue medicine as needed.             YELLOW ZONE = Not Enough Control    My headache plan isn t always working.   My headaches keep me from doing   some of the things I need to do.   I WILL:     ? Set goals to control my triggers and act on them.  ? Write in my migraine diary each time I have a headache and review it for                     patterns or new triggers.  ? Keep taking my preventive medicine daily.  ? Take my rescue medicine as needed.  l Make sure I stay hydrated.  ? Call my provider or clinic if my rescue medication did not help after taking it on             at least two separate headache days  ? Call my provider or clinic if I need to use my rescue medicine more than an                  average of 2 times per week over the course of one month.               RED ZONE = Poor or No Control    My headache plan has  failed. I can t do anything  when I have one. My  medicines aren t working.   I WILL:   ? Keep taking my preventive medicine daily.  ? Make sure I stay hydrated.  ? Call my provider or clinic if my medicines are not helping or if I am not able to              keep fluids down because of nausea.  ? Let my provider or clinic know within 2 weeks if I have gone to an urgent care or          emergency  department.          Provider specific instructions:  None    Do not take over the counter pain relievers more than 14 days per month. This includes NSAIDS (Ibuprofen, Motrin, Advil, Naproxen, Aleve, etc), acetaminophen (Tylenol), aspirin, and Excedrin.     If you use a triptan medicine (sumatriptan, rizatriptan, frovatriptan, zolmitriptan, eletriptan etc) take it as soon as you notice the migraine starting. You can take a second dose 2 hours after the first dose if you still have any migraine symptoms.    It is fine to take 600 mg of ibuprofen (Advil) or 440 mg of naproxen (Aleve) with the triptan medicine.  Try not to use triptan medicines more than 2 days a week.    If you use your rescue medicine more than 2 times per week over the course of 1 month, set up an appointment with your provider to talk about starting a medication to prevent migraines.               Other Things I Can Do to Help My Migraines   Track Migraines and Triggers     Keep a headache journal of your symptoms. Try to track how often you have a headache, how long it lasts and what medicines you used. You can also track severity of headache.    You can use a smart phone cheryl: My Migraine Maikel. The information that you track in the cheryl can be uploaded for your provider through [employer].     You can also track your migraines on paper. The clinic can print a copy of the Migraine Diary for you. Link: [email or web address]      Lifestyle Changes 1. Try to drink enough water each day (48-70 fluid ounces a day)  2. Limit caffeine intact-one caffeinated beverage a day  3. Eat regular meals  4. Try to get cardiovascular exercise (walking, swimming, biking) 4-5 times a week  5. Try to have a routine sleep schedule going to bed at the same time each night and getting up the same time each morning with enough time to sleep in between  6. Sometimes foods can trigger migraines you can try to eliminate them if you think they make symptoms worse: dairy,  gluten, nuts (cashews, almonds), artificial sweeteners, artificial colors, high sugar content foods, certain alcohol, chocolate, and preservatives like MSG and nitrates.      Other Therapies  Talk to your doctor about adding other therapies to your headache treatment plan including:   - Cognitive behavioral therapy  - Biofeedback  - Practice therapeutic techniques at home such as Progressive Relaxation and Deep Breathing    Research suggests that combining one or more of these therapies with medication can be helpful to reduce the number and severity of migraines.     Learn More To learn more about headaches you can go to the following websites:  https://americanmigrainefoundation.org/   http://www.headaches.org/

## 2019-12-16 NOTE — LETTER
My Depression Action Plan  Name: Toño Gutierrez   Date of Birth 1975  Date: 12/16/2019    My doctor: Yelitza Roche   My clinic: [facility]  [address]  [place] [address]  [phone]          GREEN    ZONE   Good Control    What it looks like:     Things are going generally well. You have normal up s and down s. You may even feel depressed from time to time, but bad moods usually last less than a day.   What you need to do:  1. Continue to care for yourself (see self care plan)  2. Check your depression survival kit and update it as needed  3. Follow your physician s recommendations including any medication.  4. Do not stop taking medication unless you consult with your physician first.           YELLOW         ZONE Getting Worse    What it looks like:     Depression is starting to interfere with your life.     It may be hard to get out of bed; you may be starting to isolate yourself from others.    Symptoms of depression are starting to last most all day and this has happened for several days.     You may have suicidal thoughts but they are not constant.   What you need to do:     1. Call your care team, your response to treatment will improve if you keep your care team informed of your progress. Yellow periods are signs an adjustment may need to be made.     2. Continue your self-care, even if you have to fake it!    3. Talk to someone in your support network    4. Open up your depression survival kit           RED    ZONE Medical Alert - Get Help    What it looks like:     Depression is seriously interfering with your life.     You may experience these or other symptoms: You can t get out of bed most days, can t work or engage in other necessary activities, you have trouble taking care of basic hygiene, or basic responsibilities, thoughts of suicide or death that will not go away, self-injurious behavior.     What you need to do:  1. Call your care  team and request a same-day appointment. If they are not available (weekends or after hours) call your local crisis line, emergency room or 911.            Depression Self Care Plan / Survival Kit    Self-Care for Depression  Here s the deal. Your body and mind are really not as separate as most people think.  What you do and think affects how you feel and how you feel influences what you do and think. This means if you do things that people who feel good do, it will help you feel better.  Sometimes this is all it takes.  There is also a place for medication and therapy depending on how severe your depression is, so be sure to consult with your medical provider and/ or Behavioral Health Consultant if your symptoms are worsening or not improving.     In order to better manage my stress, I will:    Exercise  Get some form of exercise, every day. This will help reduce pain and release endorphins, the  feel good  chemicals in your brain. This is almost as good as taking antidepressants!  This is not the same as joining a gym and then never going! (they count on that by the way ) It can be as simple as just going for a walk or doing some gardening, anything that will get you moving.      Hygiene   Maintain good hygiene (Get out of bed in the morning, Make your bed, Brush your teeth, Take a shower, and Get dressed like you were going to work, even if you are unemployed).  If your clothes don't fit try to get ones that do.    Diet  I will strive to eat foods that are good for me, drink plenty of water, and avoid excessive sugar, caffeine, alcohol, and other mood-altering substances.  Some foods that are helpful in depression are: complex carbohydrates, B vitamins, flaxseed, fish or fish oil, fresh fruits and vegetables.    Psychotherapy  I agree to participate in Individual Therapy (if recommended).    Medication  If prescribed medications, I agree to take them.  Missing doses can result in serious side effects.  I  understand that drinking alcohol, or other illicit drug use, may cause potential side effects.  I will not stop my medication abruptly without first discussing it with my provider.    Staying Connected With Others  I will stay in touch with my friends, family members, and my primary care provider/team.    Use your imagination  Be creative.  We all have a creative side; it doesn t matter if it s oil painting, sand castles, or mud pies! This will also kick up the endorphins.    Witness Beauty  (AKA stop and smell the roses) Take a look outside, even in mid-winter. Notice colors, textures. Watch the squirrels and birds.     Service to others  Be of service to others.  There is always someone else in need.  By helping others we can  get out of ourselves  and remember the really important things.  This also provides opportunities for practicing all the other parts of the program.    Humor  Laugh and be silly!  Adjust your TV habits for less news and crime-drama and more comedy.    Control your stress  Try breathing deep, massage therapy, biofeedback, and meditation. Find time to relax each day.     My support system    Clinic Contact:  Phone number:    Contact 1:  Phone number:    Contact 2:  Phone number:    Denominational/:  Phone number:    Therapist:  Phone number:    Local crisis center:    Phone number:    Other community support:  Phone number:

## 2019-12-16 NOTE — RESULT ENCOUNTER NOTE
Glucose elevated, A1C ordered  Lipids are higher, I recommend increasing his Zocor to 1.5 tabs daily (Rx changed at pharmacy.   Low fat diet, Fish oil 3 per day.  Should work toward getting back on board with healthy diet as discussed at visit.    LFTs a bit elevated, similar to prior  Avoid tylenol, alcohol.    The 10-year ASCVD risk score (Chio PATRICIA Jr., et al., 2013) is: 1.7%    Values used to calculate the score:      Age: 44 years      Sex: Male      Is Non- : No      Diabetic: No      Tobacco smoker: No      Systolic Blood Pressure: 128 mmHg      Is BP treated: Yes      HDL Cholesterol: 65 mg/dL      Total Cholesterol: 242 mg/dL    Yelitza JENKINSSt. Peter's Health Partners  [phone]

## 2019-12-16 NOTE — NURSING NOTE
"Chief Complaint   Patient presents with     Hypertension     Hyperlipidemia     Depression     Anxiety     Headache     Migraine       Initial /78 (BP Location: Right arm, Patient Position: Sitting, Cuff Size: Adult Large)   Pulse 80   Temp 98  F (36.7  C) (Tympanic)   Resp 14   Ht 1.803 m (5' 11\")   Wt 119.7 kg (264 lb)   SpO2 96%   BMI 36.82 kg/m   Estimated body mass index is 36.82 kg/m  as calculated from the following:    Height as of this encounter: 1.803 m (5' 11\").    Weight as of this encounter: 119.7 kg (264 lb).  Medication Reconciliation: complete  Teresa Alberts LPN    "

## 2019-12-17 ASSESSMENT — ANXIETY QUESTIONNAIRES: GAD7 TOTAL SCORE: 3

## 2020-05-05 ENCOUNTER — VIRTUAL VISIT (OUTPATIENT)
Dept: FAMILY MEDICINE | Facility: OTHER | Age: 45
End: 2020-05-05
Attending: NURSE PRACTITIONER
Payer: COMMERCIAL

## 2020-05-05 DIAGNOSIS — L92.0 GRANULOMA ANNULARE: Primary | ICD-10-CM

## 2020-05-05 PROCEDURE — 99213 OFFICE O/P EST LOW 20 MIN: CPT | Mod: 95 | Performed by: NURSE PRACTITIONER

## 2020-05-05 RX ORDER — METHYLPREDNISOLONE 4 MG
TABLET, DOSE PACK ORAL
Qty: 21 TABLET | Refills: 0 | Status: SHIPPED | OUTPATIENT
Start: 2020-05-05 | End: 2020-06-16

## 2020-05-05 NOTE — Clinical Note
Referral placed to [facility].  They are open and seeing patients. Please send my note and [facility] biopsy report.  He will also need a visit with me in April - am fasting, chronic disease management    Thank you    Yelitza RODRÍGUEZ  [phone]

## 2020-05-05 NOTE — PROGRESS NOTES
"Toño Gutierrez is a 45 year old male who is being evaluated via a billable telephone visit.      The patient has been notified of following:     \"This telephone visit will be conducted via a call between you and your physician/provider. We have found that certain health care needs can be provided without the need for a physical exam.  This service lets us provide the care you need with a short phone conversation.  If a prescription is necessary we can send it directly to your pharmacy.  If lab work is needed we can place an order for that and you can then stop by our lab to have the test done at a later time.    Telephone visits are billed at different rates depending on your insurance coverage. During this emergency period, for some insurers they may be billed the same as an in-person visit.  Please reach out to your insurance provider with any questions.    If during the course of the call the physician/provider feels a telephone visit is not appropriate, you will not be charged for this service.\"    Patient has given verbal consent for Telephone visit?  Yes    What phone number would you like to be contacted at? [phone]    How would you like to obtain your AVS? Mail a copy      Toño Gutierrez is a 45 year old male who presents to clinic today for the following health issues:        Possible fungal infection    Duration: Several months    Description (location/character/radiation): Arms, abdomen, legs, armpits, hairline    Intensity:  moderate    Accompanying signs and symptoms: none    History (similar episodes/previous evaluation): Has been seen several times for this    Precipitating or alleviating factors: None    Therapies tried and outcome: Nystatin and Kenalog-neither helped, Aclovate-did not do anything else         Was seen at [facility] months back and had a biopsy of rash, result :  Granuloma Annulare.  He has tried a variety of topical preparations, clobetasol most recently prescribed was not " covered.         Patient Active Problem List   Diagnosis     Major depression, recurrent (H)     ED (erectile dysfunction)     Intractable chronic migraine without aura     Social anxiety disorder     ACP (advance care planning)     Benign essential hypertension     Vitamin D deficiency     Hyperlipidemia LDL goal <100     Taking a statin medication     Obesity (BMI 35.0-39.9) with comorbidity (H)     Past Surgical History:   Procedure Laterality Date     CIRCUMCISION       ORTHOPEDIC SURGERY  2003    rt ruthie teixeira'ed and pinned     ring finger open reduction internal fixation      right       Social History     Tobacco Use     Smoking status: Former Smoker     Packs/day: 0.50     Types: Cigarettes     Smokeless tobacco: Former User     Quit date: 2/13/2014   Substance Use Topics     Alcohol use: Yes     Family History   Problem Relation Age of Onset     Hypertension Maternal Grandmother      Crohn's Disease Sister              Current Outpatient Medications   Medication Sig Dispense Refill     aspirin 81 MG EC tablet Take 1 tablet (81 mg) by mouth daily 90 tablet 11     cholecalciferol (VITAMIN D3) 5000 UNITS TABS tablet Take 1 tablet (5,000 Units) by mouth daily 90 tablet 11     clonazePAM (KLONOPIN) 1 MG tablet TAKE 1 TABLET BY MOUTH THREE TIMES DAILY AS NEEDED FOR ANXIETY 90 tablet 5     lisinopril (PRINIVIL/ZESTRIL) 20 MG tablet Take 1 tablet (20 mg) by mouth daily 90 tablet 3     metoprolol succinate ER (TOPROL-XL) 50 MG 24 hr tablet Take 1 tablet (50 mg) by mouth 2 times daily 180 tablet 3     simvastatin (ZOCOR) 40 MG tablet 1.5 tabs daily for 60 mg 90 tablet 3     SUMAtriptan (IMITREX) 50 MG tablet Take 1 tablet (50 mg) by mouth at onset of headache for migraine TAKE 1 AT ONSET OF HEADACHE, MAY REPEAT AFTER 2 HR, NOT TO EXEED 200MG DAILY 30 tablet 3     venlafaxine (EFFEXOR) 75 MG tablet TAKE 1 TABLET(75 MG) BY MOUTH THREE TIMES DAILY 180 tablet 0         Allergies   Allergen Reactions     Erythromycin  Base [Kdc:Yellow Dye+Erythromycin+Brilliant Blue Fcf]          Recent Labs   Lab Test 12/16/19  0934 08/20/19  0934 01/09/19  1005   A1C 5.6  --   --    * 140* 111*   HDL 65 64 60   TRIG 195* 131 131   * 114* 85*   CR 0.85 0.81 0.87   GFRESTIMATED >90 >90 >90   GFRESTBLACK >90 >90 >90   POTASSIUM 4.1 4.3 4.3   TSH 1.26 1.07 1.17          BP Readings from Last 3 Encounters:   12/16/19 128/78   08/20/19 130/78   01/09/19 132/78    Wt Readings from Last 3 Encounters:   12/16/19 119.7 kg (264 lb)   08/20/19 116.3 kg (256 lb 8 oz)   01/09/19 109.6 kg (241 lb 9.6 oz)                 Reviewed and updated as needed this visit by Provider            Review of Systems   ROS COMP: Constitutional, HEENT, cardiovascular, pulmonary, gi and gu systems are negative, except as otherwise noted.         Objective   Reported vitals:  Denies fever at home  healthy, alert and no distress  PSYCH: Alert and oriented times 3; coherent speech, normal   rate and volume, able to articulate logical thoughts, able   to abstract reason, no tangential thoughts, no hallucinations   or delusions  His affect is normal  RESP: No cough, no audible wheezing, able to talk in full sentences  Remainder of exam unable to be completed due to telephone visits            Assessment/Plan:    1. Granuloma annulare  - methylPREDNISolone (MEDROL DOSEPAK) 4 MG tablet therapy pack; Follow Package Directions  Dispense: 21 tablet; Refill: 0  - DERMATOLOGY REFERRAL        Follow-up visit - July      Phone call duration:  20 minutes      Yelitza RODRÍGUEZ  [phone]

## 2020-05-05 NOTE — PATIENT INSTRUCTIONS
Assessment/Plan:    1. Granuloma annulare  - methylPREDNISolone (MEDROL DOSEPAK) 4 MG tablet therapy pack; Follow Package Directions  Dispense: 21 tablet; Refill: 0  - DERMATOLOGY REFERRAL        Follow-up visit - July        Yelitza RODRÍGUEZ  [phone]       detailed exam

## 2020-05-21 DIAGNOSIS — F40.10 SOCIAL ANXIETY DISORDER: ICD-10-CM

## 2020-05-21 DIAGNOSIS — F33.1 MODERATE EPISODE OF RECURRENT MAJOR DEPRESSIVE DISORDER (H): ICD-10-CM

## 2020-05-21 RX ORDER — VENLAFAXINE 75 MG/1
TABLET ORAL
Qty: 90 TABLET | Refills: 0 | Status: SHIPPED | OUTPATIENT
Start: 2020-05-21 | End: 2020-06-16

## 2020-05-21 NOTE — TELEPHONE ENCOUNTER
venlafaxine      Last Written Prescription Date:  01/20/2020  Last Fill Quantity: 180,   # refills: 0  Last Office Visit: 05/05/2020 virtual  Future Office visit:    Next 5 appointments (look out 90 days)    Michel 15, 2020  8:30 AM CDT  (Arrive by 8:15 AM)  SHORT with Yelitza Roche CNP  [facility] ([facility] ) [address]  [place] [address]  [phone]

## 2020-05-21 NOTE — TELEPHONE ENCOUNTER
PHQ-9 score:    PHQ 12/16/2019   PHQ-9 Total Score 0   Q9: Thoughts of better off dead/self-harm past 2 weeks Not at all           Pended.      Lorri Kirby RN

## 2020-06-16 ENCOUNTER — VIRTUAL VISIT (OUTPATIENT)
Dept: FAMILY MEDICINE | Facility: OTHER | Age: 45
End: 2020-06-16
Attending: NURSE PRACTITIONER
Payer: COMMERCIAL

## 2020-06-16 VITALS
WEIGHT: 262 LBS | SYSTOLIC BLOOD PRESSURE: 124 MMHG | BODY MASS INDEX: 36.68 KG/M2 | HEIGHT: 71 IN | DIASTOLIC BLOOD PRESSURE: 78 MMHG

## 2020-06-16 DIAGNOSIS — F40.10 SOCIAL ANXIETY DISORDER: ICD-10-CM

## 2020-06-16 DIAGNOSIS — E78.5 HYPERLIPIDEMIA LDL GOAL <100: Primary | ICD-10-CM

## 2020-06-16 DIAGNOSIS — F33.1 MODERATE EPISODE OF RECURRENT MAJOR DEPRESSIVE DISORDER (H): ICD-10-CM

## 2020-06-16 DIAGNOSIS — G43.719 INTRACTABLE CHRONIC MIGRAINE WITHOUT AURA AND WITHOUT STATUS MIGRAINOSUS: ICD-10-CM

## 2020-06-16 DIAGNOSIS — I10 BENIGN ESSENTIAL HYPERTENSION: ICD-10-CM

## 2020-06-16 PROCEDURE — 99214 OFFICE O/P EST MOD 30 MIN: CPT | Mod: 95 | Performed by: NURSE PRACTITIONER

## 2020-06-16 RX ORDER — CLONAZEPAM 1 MG/1
TABLET ORAL
Qty: 90 TABLET | Refills: 3 | Status: SHIPPED | OUTPATIENT
Start: 2020-06-16 | End: 2021-01-06

## 2020-06-16 RX ORDER — SUMATRIPTAN 50 MG/1
50 TABLET, FILM COATED ORAL
Qty: 30 TABLET | Refills: 3 | Status: SHIPPED | OUTPATIENT
Start: 2020-06-16 | End: 2021-08-17

## 2020-06-16 RX ORDER — METOPROLOL SUCCINATE 50 MG/1
50 TABLET, EXTENDED RELEASE ORAL 2 TIMES DAILY
Qty: 180 TABLET | Refills: 3 | Status: SHIPPED | OUTPATIENT
Start: 2020-06-16 | End: 2021-02-16

## 2020-06-16 RX ORDER — SIMVASTATIN 40 MG
TABLET ORAL
Qty: 90 TABLET | Refills: 3 | Status: SHIPPED | OUTPATIENT
Start: 2020-06-16 | End: 2021-02-16

## 2020-06-16 RX ORDER — VENLAFAXINE 75 MG/1
TABLET ORAL
Qty: 90 TABLET | Refills: 1 | Status: SHIPPED | OUTPATIENT
Start: 2020-06-16 | End: 2020-10-20

## 2020-06-16 RX ORDER — LISINOPRIL 20 MG/1
20 TABLET ORAL DAILY
Qty: 90 TABLET | Refills: 3 | Status: SHIPPED | OUTPATIENT
Start: 2020-06-16 | End: 2021-02-16

## 2020-06-16 ASSESSMENT — PAIN SCALES - GENERAL: PAINLEVEL: NO PAIN (0)

## 2020-06-16 ASSESSMENT — MIFFLIN-ST. JEOR: SCORE: 2105.55

## 2020-06-16 ASSESSMENT — PATIENT HEALTH QUESTIONNAIRE - PHQ9: SUM OF ALL RESPONSES TO PHQ QUESTIONS 1-9: 1

## 2020-06-16 NOTE — PATIENT INSTRUCTIONS
Assessment/Plan:    1. Hyperlipidemia LDL goal <100  - simvastatin (ZOCOR) 40 MG tablet; 1.5 tabs daily for 60 mg  Dispense: 90 tablet; Refill: 3  - Lipid Profile (Chol, Trig, HDL, LDL calc); Future  - Comprehensive metabolic panel; Future    2. Benign essential hypertension  - metoprolol succinate ER (TOPROL-XL) 50 MG 24 hr tablet; Take 1 tablet (50 mg) by mouth 2 times daily  Dispense: 180 tablet; Refill: 3  - lisinopril (ZESTRIL) 20 MG tablet; Take 1 tablet (20 mg) by mouth daily  Dispense: 90 tablet; Refill: 3  - Comprehensive metabolic panel; Future  - TSH with free T4 reflex; Future    3. Moderate episode of recurrent major depressive disorder (H)  - venlafaxine (EFFEXOR) 75 MG tablet; TAKE 1 TABLET(75 MG) BY MOUTH THREE TIMES DAILY  Dispense: 90 tablet; Refill: 1    4. Social anxiety disorder  - venlafaxine (EFFEXOR) 75 MG tablet; TAKE 1 TABLET(75 MG) BY MOUTH THREE TIMES DAILY  Dispense: 90 tablet; Refill: 1  - clonazePAM (KLONOPIN) 1 MG tablet; TAKE 1 TABLET BY MOUTH THREE TIMES DAILY AS NEEDED FOR ANXIETY  Dispense: 90 tablet; Refill: 3    5. Intractable chronic migraine without aura and without status migrainosus  - SUMAtriptan (IMITREX) 50 MG tablet; Take 1 tablet (50 mg) by mouth at onset of headache for migraine TAKE 1 AT ONSET OF HEADACHE, MAY REPEAT AFTER 2 HR, NOT TO EXEED 200MG DAILY  Dispense: 30 tablet; Refill: 3        Phone call duration:  13  minutes      Yelitza JENKINSMELANIA  [phone]

## 2020-06-16 NOTE — NURSING NOTE
"Chief Complaint   Patient presents with     Hypertension     Lipids       Initial /78   Ht 1.803 m (5' 11\")   Wt 118.8 kg (262 lb)   BMI 36.54 kg/m   Estimated body mass index is 36.54 kg/m  as calculated from the following:    Height as of this encounter: 1.803 m (5' 11\").    Weight as of this encounter: 118.8 kg (262 lb).  Medication Reconciliation: complete  Pamela M. Lechevalier, LPN    "

## 2020-06-16 NOTE — PROGRESS NOTES
"Toño Gutierrez is a 45 year old male who is being evaluated via a billable telephone visit.      The patient has been notified of following:     \"This telephone visit will be conducted via a call between you and your physician/provider. We have found that certain health care needs can be provided without the need for a physical exam.  This service lets us provide the care you need with a short phone conversation.  If a prescription is necessary we can send it directly to your pharmacy.  If lab work is needed we can place an order for that and you can then stop by our lab to have the test done at a later time.    Telephone visits are billed at different rates depending on your insurance coverage. During this emergency period, for some insurers they may be billed the same as an in-person visit.  Please reach out to your insurance provider with any questions.    If during the course of the call the physician/provider feels a telephone visit is not appropriate, you will not be charged for this service.\"    Patient has given verbal consent for Telephone visit?  Yes    What phone number would you like to be contacted at? [phone]    How would you like to obtain your AVS? Elizabeth        Toño Gutierrez is a 45 year old male who presents via phone visit today for the following health issues:        Hyperlipidemia Follow-Up    Are you regularly taking any medication or supplement to lower your cholesterol?   Yes- zocor 40 mg    Are you having muscle aches or other side effects that you think could be caused by your cholesterol lowering medication?  No      Hypertension Follow-up    Do you check your blood pressure regularly outside of the clinic? Yes     Are you following a low salt diet? Yes    Are your blood pressures ever more than 140 on the top number (systolic) OR more   than 90 on the bottom number (diastolic), for example 140/90? No       Depression and Anxiety Follow-Up    How are you doing with your " depression since your last visit? No change    How are you doing with your anxiety since your last visit?  No change    Are you having other symptoms that might be associated with depression or anxiety? No    Have you had a significant life event? No     Do you have any concerns with your use of alcohol or other drugs? No           How many servings of fruits and vegetables do you eat daily?  2-3    On average, how many sweetened beverages do you drink each day (Examples: soda, juice, sweet tea, etc.  Do NOT count diet or artificially sweetened beverages)?   0    How many days per week do you exercise enough to make your heart beat faster? 3 or less    How many minutes a day do you exercise enough to make your heart beat faster? 30 - 60    How many days per week do you miss taking your medication? 0        Social History     Tobacco Use     Smoking status: Former Smoker     Packs/day: 0.50     Types: Cigarettes     Smokeless tobacco: Former User     Quit date: 2/13/2014   Substance Use Topics     Alcohol use: Yes     Drug use: No         PHQ 8/20/2019 12/16/2019 6/16/2020   PHQ-9 Total Score 0 0 1   Q9: Thoughts of better off dead/self-harm past 2 weeks Not at all Not at all Not at all         AMRIT-7 SCORE 1/9/2019 8/20/2019 12/16/2019   Total Score 2 4 3           Patient Active Problem List   Diagnosis     Major depression, recurrent (H)     ED (erectile dysfunction)     Intractable chronic migraine without aura     Social anxiety disorder     ACP (advance care planning)     Benign essential hypertension     Vitamin D deficiency     Hyperlipidemia LDL goal <100     Taking a statin medication     Obesity (BMI 35.0-39.9) with comorbidity (H)     Past Surgical History:   Procedure Laterality Date     CIRCUMCISION       ORTHOPEDIC SURGERY  2003    rt ruthie teixeira'ed and pinned     ring finger open reduction internal fixation      right       Social History     Tobacco Use     Smoking status: Former Smoker      Packs/day: 0.50     Types: Cigarettes     Smokeless tobacco: Former User     Quit date: 2/13/2014   Substance Use Topics     Alcohol use: Yes     Family History   Problem Relation Age of Onset     Hypertension Maternal Grandmother      Crohn's Disease Sister              Current Outpatient Medications   Medication Sig Dispense Refill     aspirin 81 MG EC tablet Take 1 tablet (81 mg) by mouth daily 90 tablet 11     cholecalciferol (VITAMIN D3) 5000 UNITS TABS tablet Take 1 tablet (5,000 Units) by mouth daily 90 tablet 11     clonazePAM (KLONOPIN) 1 MG tablet TAKE 1 TABLET BY MOUTH THREE TIMES DAILY AS NEEDED FOR ANXIETY 90 tablet 5     lisinopril (PRINIVIL/ZESTRIL) 20 MG tablet Take 1 tablet (20 mg) by mouth daily 90 tablet 3     metoprolol succinate ER (TOPROL-XL) 50 MG 24 hr tablet Take 1 tablet (50 mg) by mouth 2 times daily 180 tablet 3     simvastatin (ZOCOR) 40 MG tablet 1.5 tabs daily for 60 mg 90 tablet 3     SUMAtriptan (IMITREX) 50 MG tablet Take 1 tablet (50 mg) by mouth at onset of headache for migraine TAKE 1 AT ONSET OF HEADACHE, MAY REPEAT AFTER 2 HR, NOT TO EXEED 200MG DAILY 30 tablet 3     venlafaxine (EFFEXOR) 75 MG tablet TAKE 1 TABLET(75 MG) BY MOUTH THREE TIMES DAILY 90 tablet 0       Allergies   Allergen Reactions     Erythromycin Base [Kdc:Yellow Dye+Erythromycin+Brilliant Blue Fcf]          Recent Labs   Lab Test 12/16/19  0934 08/20/19  0934 01/09/19  1005   A1C 5.6  --   --    * 140* 111*   HDL 65 64 60   TRIG 195* 131 131   * 114* 85*   CR 0.85 0.81 0.87   GFRESTIMATED >90 >90 >90   GFRESTBLACK >90 >90 >90   POTASSIUM 4.1 4.3 4.3   TSH 1.26 1.07 1.17          BP Readings from Last 3 Encounters:   06/16/20 124/78   12/16/19 128/78   08/20/19 130/78    Wt Readings from Last 3 Encounters:   06/16/20 118.8 kg (262 lb)   12/16/19 119.7 kg (264 lb)   08/20/19 116.3 kg (256 lb 8 oz)              Reviewed and updated as needed this visit by Provider         Review of Systems  "  Constitutional, HEENT, cardiovascular, pulmonary, gi and gu systems are negative, except as otherwise noted.         Objective   Reported vitals:  /78   Ht 1.803 m (5' 11\")   Wt 118.8 kg (262 lb)   BMI 36.54 kg/m     healthy, alert and no distress  PSYCH: Alert and oriented times 3; coherent speech, normal   rate and volume, able to articulate logical thoughts, able   to abstract reason, no tangential thoughts, no hallucinations   or delusions  His affect is normal  RESP: No cough, no audible wheezing, able to talk in full sentences  Remainder of exam unable to be completed due to telephone visits          Assessment/Plan:    1. Hyperlipidemia LDL goal <100  - simvastatin (ZOCOR) 40 MG tablet; 1.5 tabs daily for 60 mg  Dispense: 90 tablet; Refill: 3  - Lipid Profile (Chol, Trig, HDL, LDL calc); Future  - Comprehensive metabolic panel; Future    2. Benign essential hypertension  - metoprolol succinate ER (TOPROL-XL) 50 MG 24 hr tablet; Take 1 tablet (50 mg) by mouth 2 times daily  Dispense: 180 tablet; Refill: 3  - lisinopril (ZESTRIL) 20 MG tablet; Take 1 tablet (20 mg) by mouth daily  Dispense: 90 tablet; Refill: 3  - Comprehensive metabolic panel; Future  - TSH with free T4 reflex; Future    3. Moderate episode of recurrent major depressive disorder (H)  - venlafaxine (EFFEXOR) 75 MG tablet; TAKE 1 TABLET(75 MG) BY MOUTH THREE TIMES DAILY  Dispense: 90 tablet; Refill: 1    4. Social anxiety disorder  - venlafaxine (EFFEXOR) 75 MG tablet; TAKE 1 TABLET(75 MG) BY MOUTH THREE TIMES DAILY  Dispense: 90 tablet; Refill: 1  - clonazePAM (KLONOPIN) 1 MG tablet; TAKE 1 TABLET BY MOUTH THREE TIMES DAILY AS NEEDED FOR ANXIETY  Dispense: 90 tablet; Refill: 3    5. Intractable chronic migraine without aura and without status migrainosus  - SUMAtriptan (IMITREX) 50 MG tablet; Take 1 tablet (50 mg) by mouth at onset of headache for migraine TAKE 1 AT ONSET OF HEADACHE, MAY REPEAT AFTER 2 HR, NOT TO EXEED 200MG DAILY  " Dispense: 30 tablet; Refill: 3        Phone call duration:  13  minutes      Yelitza Roche [facility]  [phone]

## 2020-06-17 ENCOUNTER — TRANSFERRED RECORDS (OUTPATIENT)
Dept: HEALTH INFORMATION MANAGEMENT | Facility: CLINIC | Age: 45
End: 2020-06-17

## 2020-08-11 DIAGNOSIS — L20.9 ATOPIC DERMATITIS, UNSPECIFIED TYPE: Primary | ICD-10-CM

## 2020-08-11 RX ORDER — METHYLPREDNISOLONE 4 MG
TABLET, DOSE PACK ORAL
Qty: 21 TABLET | Refills: 0 | Status: SHIPPED | OUTPATIENT
Start: 2020-08-11 | End: 2020-08-24

## 2020-08-18 DIAGNOSIS — I10 BENIGN ESSENTIAL HYPERTENSION: ICD-10-CM

## 2020-08-18 DIAGNOSIS — R73.09 ELEVATED GLUCOSE: Primary | ICD-10-CM

## 2020-08-18 DIAGNOSIS — E78.5 HYPERLIPIDEMIA LDL GOAL <100: ICD-10-CM

## 2020-08-18 LAB
ALBUMIN SERPL-MCNC: 4.5 G/DL (ref 3.4–5)
ALP SERPL-CCNC: 106 U/L (ref 40–150)
ALT SERPL W P-5'-P-CCNC: 171 U/L (ref 0–70)
ANION GAP SERPL CALCULATED.3IONS-SCNC: 6 MMOL/L (ref 3–14)
AST SERPL W P-5'-P-CCNC: 56 U/L (ref 0–45)
BILIRUB SERPL-MCNC: 0.7 MG/DL (ref 0.2–1.3)
BUN SERPL-MCNC: 17 MG/DL (ref 7–30)
CALCIUM SERPL-MCNC: 9.5 MG/DL (ref 8.5–10.1)
CHLORIDE SERPL-SCNC: 103 MMOL/L (ref 94–109)
CHOLEST SERPL-MCNC: 228 MG/DL
CO2 SERPL-SCNC: 26 MMOL/L (ref 20–32)
CREAT SERPL-MCNC: 0.72 MG/DL (ref 0.66–1.25)
EST. AVERAGE GLUCOSE BLD GHB EST-MCNC: 111 MG/DL
GFR SERPL CREATININE-BSD FRML MDRD: >90 ML/MIN/{1.73_M2}
GLUCOSE SERPL-MCNC: 124 MG/DL (ref 70–99)
HBA1C MFR BLD: 5.5 % (ref 0–5.6)
HDLC SERPL-MCNC: 70 MG/DL
LDLC SERPL CALC-MCNC: 143 MG/DL
NONHDLC SERPL-MCNC: 158 MG/DL
POTASSIUM SERPL-SCNC: 3.8 MMOL/L (ref 3.4–5.3)
PROT SERPL-MCNC: 8.3 G/DL (ref 6.8–8.8)
SODIUM SERPL-SCNC: 135 MMOL/L (ref 133–144)
TRIGL SERPL-MCNC: 73 MG/DL
TSH SERPL DL<=0.005 MIU/L-ACNC: 1.25 MU/L (ref 0.4–4)

## 2020-08-18 PROCEDURE — 84443 ASSAY THYROID STIM HORMONE: CPT | Performed by: NURSE PRACTITIONER

## 2020-08-18 PROCEDURE — 80061 LIPID PANEL: CPT | Performed by: NURSE PRACTITIONER

## 2020-08-18 PROCEDURE — 83036 HEMOGLOBIN GLYCOSYLATED A1C: CPT | Performed by: NURSE PRACTITIONER

## 2020-08-18 PROCEDURE — 36415 COLL VENOUS BLD VENIPUNCTURE: CPT | Performed by: NURSE PRACTITIONER

## 2020-08-18 PROCEDURE — 80053 COMPREHEN METABOLIC PANEL: CPT | Performed by: NURSE PRACTITIONER

## 2020-08-18 NOTE — RESULT ENCOUNTER NOTE
Glucose elevated - I added an A1C  Lipids elevated but risk score is good!    Will discuss in further detail next week - phone visit upcoming.    The 10-year ASCVD risk score (Chio PATRICIA Jr., et al., 2013) is: 1.4%    Values used to calculate the score:      Age: 45 years      Sex: Male      Is Non- : No      Diabetic: No      Tobacco smoker: No      Systolic Blood Pressure: 124 mmHg      Is BP treated: Yes      HDL Cholesterol: 70 mg/dL      Total Cholesterol: 228 mg/dL

## 2020-08-24 ENCOUNTER — VIRTUAL VISIT (OUTPATIENT)
Dept: FAMILY MEDICINE | Facility: OTHER | Age: 45
End: 2020-08-24
Attending: NURSE PRACTITIONER
Payer: COMMERCIAL

## 2020-08-24 DIAGNOSIS — F33.1 MODERATE EPISODE OF RECURRENT MAJOR DEPRESSIVE DISORDER (H): ICD-10-CM

## 2020-08-24 DIAGNOSIS — E78.5 HYPERLIPIDEMIA LDL GOAL <100: ICD-10-CM

## 2020-08-24 DIAGNOSIS — I10 BENIGN ESSENTIAL HYPERTENSION: ICD-10-CM

## 2020-08-24 DIAGNOSIS — G43.719 INTRACTABLE CHRONIC MIGRAINE WITHOUT AURA AND WITHOUT STATUS MIGRAINOSUS: Primary | ICD-10-CM

## 2020-08-24 DIAGNOSIS — F40.10 SOCIAL ANXIETY DISORDER: ICD-10-CM

## 2020-08-24 PROCEDURE — 99213 OFFICE O/P EST LOW 20 MIN: CPT | Mod: 95 | Performed by: NURSE PRACTITIONER

## 2020-08-24 ASSESSMENT — ANXIETY QUESTIONNAIRES
1. FEELING NERVOUS, ANXIOUS, OR ON EDGE: SEVERAL DAYS
7. FEELING AFRAID AS IF SOMETHING AWFUL MIGHT HAPPEN: SEVERAL DAYS
4. TROUBLE RELAXING: NOT AT ALL
2. NOT BEING ABLE TO STOP OR CONTROL WORRYING: SEVERAL DAYS
6. BECOMING EASILY ANNOYED OR IRRITABLE: SEVERAL DAYS
GAD7 TOTAL SCORE: 5
3. WORRYING TOO MUCH ABOUT DIFFERENT THINGS: SEVERAL DAYS
5. BEING SO RESTLESS THAT IT IS HARD TO SIT STILL: NOT AT ALL

## 2020-08-24 ASSESSMENT — PATIENT HEALTH QUESTIONNAIRE - PHQ9: SUM OF ALL RESPONSES TO PHQ QUESTIONS 1-9: 2

## 2020-08-24 NOTE — PROGRESS NOTES
"Toño Gutierrez is a 45 year old male who is being evaluated via a billable telephone visit.      The patient has been notified of following:     \"This telephone visit will be conducted via a call between you and your physician/provider. We have found that certain health care needs can be provided without the need for a physical exam.  This service lets us provide the care you need with a short phone conversation.  If a prescription is necessary we can send it directly to your pharmacy.  If lab work is needed we can place an order for that and you can then stop by our lab to have the test done at a later time.    Telephone visits are billed at different rates depending on your insurance coverage. During this emergency period, for some insurers they may be billed the same as an in-person visit.  Please reach out to your insurance provider with any questions.    If during the course of the call the physician/provider feels a telephone visit is not appropriate, you will not be charged for this service.\"    Patient has given verbal consent for Telephone visit?  Yes    What phone number would you like to be contacted at? [phone]    How would you like to obtain your AVS? Elizabeth      Toño Gutierrez is a 45 year old male who presents via phone visit today for the following health issues:        Hyperlipidemia Follow-Up    Are you regularly taking any medication or supplement to lower your cholesterol?   Yes- Simvastatin     Are you having muscle aches or other side effects that you think could be caused by your cholesterol lowering medication?  No      Hypertension Follow-up    Do you check your blood pressure regularly outside of the clinic? Yes     Are you following a low salt diet? Yes    Are your blood pressures ever more than 140 on the top number (systolic) OR more   than 90 on the bottom number (diastolic), for example 140/90? No      Depression and Anxiety Follow-Up    How are you doing with your " depression since your last visit? stable    How are you doing with your anxiety since your last visit?  stable    Are you having other symptoms that might be associated with depression or anxiety? No    Have you had a significant life event? No     Do you have any concerns with your use of alcohol or other drugs? No     Social History     Tobacco Use     Smoking status: Former Smoker     Packs/day: 0.50     Types: Cigarettes     Smokeless tobacco: Former User     Quit date: 2/13/2014   Substance Use Topics     Alcohol use: Yes     Drug use: No         PHQ 6/16/2020 8/24/2020 8/24/2020   PHQ-9 Total Score 1 3 2   Q9: Thoughts of better off dead/self-harm past 2 weeks Not at all Several days Not at all       AMRIT-7 SCORE 8/20/2019 12/16/2019 8/24/2020   Total Score 4 3 5       Added SI in error, we went through the questions again      Last PHQ-9 8/24/2020   1.  Little interest or pleasure in doing things 0   2.  Feeling down, depressed, or hopeless 0   3.  Trouble falling or staying asleep, or sleeping too much 1   4.  Feeling tired or having little energy 1   5.  Poor appetite or overeating 0   6.  Feeling bad about yourself 0   7.  Trouble concentrating 0   8.  Moving slowly or restless 0   Q9: Thoughts of better off dead/self-harm past 2 weeks 0   PHQ-9 Total Score 2   Difficulty at work, home, or with people Not difficult at all     AMRIT-7  8/24/2020   1. Feeling nervous, anxious, or on edge 1   2. Not being able to stop or control worrying 1   3. Worrying too much about different things 1   4. Trouble relaxing 0   5. Being so restless that it is hard to sit still 0   6. Becoming easily annoyed or irritable 1   7. Feeling afraid, as if something awful might happen 1   AMRIT-7 Total Score 5   If you checked any problems, how difficult have they made it for you to do your work, take care of things at home, or get along with other people? -       Suicide Assessment Five-step Evaluation and Treatment  (SAFE-T)      Migraine     Since your last clinic visit, how have your headaches changed?  No change    How often are you getting headaches or migraines? Less than weekly     Are you able to do normal daily activities when you have a migraine? Yes    Are you taking rescue/relief medications? (Select all that apply) sumatriptan (Imitrex)    How helpful is your rescue/relief medication?  I get total relief    Are you taking any medications to prevent migraines? (Select all that apply)  No    In the past 4 weeks, how often have you gone to urgent care or the emergency room because of your headaches?  0        Review of Systems   Constitutional, HEENT, cardiovascular, pulmonary, GI, , musculoskeletal, neuro, skin, endocrine and psych systems are negative, except as otherwise noted.        Patient Active Problem List   Diagnosis     Major depression, recurrent (H)     ED (erectile dysfunction)     Intractable chronic migraine without aura     Social anxiety disorder     ACP (advance care planning)     Benign essential hypertension     Vitamin D deficiency     Hyperlipidemia LDL goal <100     Taking a statin medication     Obesity (BMI 35.0-39.9) with comorbidity (H)     Past Surgical History:   Procedure Laterality Date     CIRCUMCISION       ORTHOPEDIC SURGERY  2003    rt ruthie teixeira'ed and pinned     ring finger open reduction internal fixation      right       Social History     Tobacco Use     Smoking status: Former Smoker     Packs/day: 0.50     Types: Cigarettes     Smokeless tobacco: Former User     Quit date: 2/13/2014   Substance Use Topics     Alcohol use: Yes     Family History   Problem Relation Age of Onset     Hypertension Maternal Grandmother      Crohn's Disease Sister            Current Outpatient Medications   Medication Sig Dispense Refill     aspirin 81 MG EC tablet Take 1 tablet (81 mg) by mouth daily 90 tablet 11     cholecalciferol (VITAMIN D3) 5000 UNITS TABS tablet Take 1 tablet (5,000  Units) by mouth daily 90 tablet 11     clonazePAM (KLONOPIN) 1 MG tablet TAKE 1 TABLET BY MOUTH THREE TIMES DAILY AS NEEDED FOR ANXIETY 90 tablet 3     lisinopril (ZESTRIL) 20 MG tablet Take 1 tablet (20 mg) by mouth daily 90 tablet 3     metoprolol succinate ER (TOPROL-XL) 50 MG 24 hr tablet Take 1 tablet (50 mg) by mouth 2 times daily 180 tablet 3     simvastatin (ZOCOR) 40 MG tablet 1.5 tabs daily for 60 mg 90 tablet 3     SUMAtriptan (IMITREX) 50 MG tablet Take 1 tablet (50 mg) by mouth at onset of headache for migraine TAKE 1 AT ONSET OF HEADACHE, MAY REPEAT AFTER 2 HR, NOT TO EXEED 200MG DAILY 30 tablet 3     venlafaxine (EFFEXOR) 75 MG tablet TAKE 1 TABLET(75 MG) BY MOUTH THREE TIMES DAILY 90 tablet 1         Allergies   Allergen Reactions     Erythromycin Base [Kdc:Yellow Dye+Erythromycin+Brilliant Blue Fcf]        Recent Labs   Lab Test 08/18/20  0819 08/18/20  0818 12/16/19  0934 08/20/19  0934   A1C 5.5  --  5.6  --    LDL  --  143* 138* 140*   HDL  --  70 65 64   TRIG  --  73 195* 131   ALT  --  171* 168* 114*   CR  --  0.72 0.85 0.81   GFRESTIMATED  --  >90 >90 >90   GFRESTBLACK  --  >90 >90 >90   POTASSIUM  --  3.8 4.1 4.3   TSH  --  1.25 1.26 1.07        BP Readings from Last 3 Encounters:   06/16/20 124/78   12/16/19 128/78   08/20/19 130/78    Wt Readings from Last 3 Encounters:   06/16/20 118.8 kg (262 lb)   12/16/19 119.7 kg (264 lb)   08/20/19 116.3 kg (256 lb 8 oz)           Objective   Vitals - Patient Reported  Systolic (Patient Reported): 126  Diastolic (Patient Reported): 78  Pain Score: No Pain (0)  Vitals:  No vitals were obtained today due to virtual visit.  healthy, alert and no distress  PSYCH: Alert and oriented times 3; coherent speech, normal   rate and volume, able to articulate logical thoughts, able   to abstract reason, no tangential thoughts, no hallucinations   or delusions  His affect is normal  RESP: No cough, no audible wheezing, able to talk in full sentences  Remainder of  exam unable to be completed due to telephone visits        Labs from 8/18/20 are reviewed with patient      The 10-year ASCVD risk score (Chio PATRICIA Jr., et al., 2013) is: 1.4%    Values used to calculate the score:      Age: 45 years      Sex: Male      Is Non- : No      Diabetic: No      Tobacco smoker: No      Systolic Blood Pressure: 124 mmHg      Is BP treated: Yes      HDL Cholesterol: 70 mg/dL      Total Cholesterol: 228 mg/dL      Assessment/Plan:    Assessment & Plan     Intractable chronic migraine without aura and without status migrainosus  - Continue plan of care    Hyperlipidemia LDL goal <100  - Continue plan of care    Benign essential hypertension  - Continue plan of care    Moderate episode of recurrent major depressive disorder (H)  - Continue plan of care    Social anxiety disorder  - Continue plan of care      Phone call duration:  12 minutes      Return in about 6 months (around 2/24/2021).      Yelitza Roche CNP  [facility]

## 2020-08-24 NOTE — PATIENT INSTRUCTIONS
Assessment/Plan:    Assessment & Plan     Intractable chronic migraine without aura and without status migrainosus  - Continue plan of care    Hyperlipidemia LDL goal <100  - Continue plan of care    Benign essential hypertension  - Continue plan of care    Moderate episode of recurrent major depressive disorder (H)  - Continue plan of care    Social anxiety disorder  - Continue plan of care      Return in about 6 months (around 2/24/2021).      Yelitza Roche, FRANCO  [facility] [place] [facility]

## 2020-08-25 ASSESSMENT — ANXIETY QUESTIONNAIRES: GAD7 TOTAL SCORE: 5

## 2020-10-17 DIAGNOSIS — F40.10 SOCIAL ANXIETY DISORDER: ICD-10-CM

## 2020-10-17 DIAGNOSIS — F33.1 MODERATE EPISODE OF RECURRENT MAJOR DEPRESSIVE DISORDER (H): ICD-10-CM

## 2020-10-20 RX ORDER — VENLAFAXINE 75 MG/1
TABLET ORAL
Qty: 90 TABLET | Refills: 1 | Status: SHIPPED | OUTPATIENT
Start: 2020-10-20 | End: 2021-01-06

## 2020-10-20 NOTE — TELEPHONE ENCOUNTER
effexor      Last Written Prescription Date:  6/16/2020  Last Fill Quantity: 90,   # refills: 1  Last Office Visit: 8/24/2020  Future Office visit:

## 2020-12-07 ENCOUNTER — NURSE TRIAGE (OUTPATIENT)
Dept: FAMILY MEDICINE | Facility: OTHER | Age: 45
End: 2020-12-07

## 2020-12-07 DIAGNOSIS — Z20.822 EXPOSURE TO 2019 NOVEL CORONAVIRUS: Primary | ICD-10-CM

## 2020-12-07 NOTE — TELEPHONE ENCOUNTER
"    Reason for Disposition    [1] COVID-19 EXPOSURE (Close Contact) AND [2] within last 14 days BUT [3] NO symptoms    Additional Information    Negative: COVID-19 has been diagnosed by a healthcare provider (HCP)    Negative: COVID-19 lab test positive    Negative: [1] Symptoms of COVID-19 (e.g., cough, fever, SOB, or others) AND [2] lives in an area with community spread    Negative: [1] Symptoms of COVID-19 (e.g., cough, fever, SOB, or others) AND [2] within 14 days of EXPOSURE (close contact) with diagnosed or suspected COVID-19 patient    Negative: [1] Symptoms of COVID-19 (e.g., cough, fever, SOB, or others) AND [2] within 14 days of travel from high-risk area for COVID-19 community spread (identified by CDC)    Negative: [1] Difficulty breathing (shortness of breath) occurs AND [2] onset > 14 days after COVID-19 EXPOSURE (Close Contact) AND [3] no community spread where patient lives    Negative: [1] Dry cough occurs AND [2] onset > 14 days after COVID-19 EXPOSURE AND [3] no community spread where patient lives    Negative: [1] Wet cough (i.e., white-yellow, yellow, green, or gianna colored sputum) AND [2] onset > 14 days after COVID-19 EXPOSURE AND [3] no community spread where patient lives    Negative: [1] Common cold symptoms AND [2] onset > 14 days after COVID-19 EXPOSURE AND [3] no community spread where patient lives    Negative: [1] COVID-19 EXPOSURE (Close Contact) within last 14 days AND [2] needs COVID-19 lab test to return to work AND [3] NO symptoms    Negative: [1] COVID-19 EXPOSURE (Close Contact) within last 14 days AND [2] exposed person is a healthcare worker who was NOT using all recommended personal protective equipment (i.e., a respirator-N95 mask, eye protection, gloves, and gown) AND [3] NO symptoms    Answer Assessment - Initial Assessment Questions  1. CLOSE CONTACT: \"Who is the person with the confirmed or suspected COVID-19 infection that you were exposed to?\"      roommate  2. PLACE " "of CONTACT: \"Where were you when you were exposed to COVID-19?\" (e.g., home, school, medical waiting room; which city?)      home  3. TYPE of CONTACT: \"How much contact was there?\" (e.g., sitting next to, live in same house, work in same office, same building)      Lives together  4. DURATION of CONTACT: \"How long were you in contact with the COVID-19 patient?\" (e.g., a few seconds, passed by person, a few minutes, live with the patient)      Lives together  5. DATE of CONTACT: \"When did you have contact with a COVID-19 patient?\" (e.g., how many days ago)      today  6. TRAVEL: \"Have you traveled out of the country recently?\" If so, \"When and where?\"      * Also ask about out-of-state travel, since the [facility] has identified some high-risk cities for community spread in the .      * Note: Travel becomes less relevant if there is widespread community transmission where the patient lives.      no  7. COMMUNITY SPREAD: \"Are there lots of cases of COVID-19 (community spread) where you live?\" (See public health department website, if unsure)        yes  8. SYMPTOMS: \"Do you have any symptoms?\" (e.g., fever, cough, breathing difficulty)      no  9. PREGNANCY OR POSTPARTUM: \"Is there any chance you are pregnant?\" \"When was your last menstrual period?\" \"Did you deliver in the last 2 weeks?\"      na  10. HIGH RISK: \"Do you have any heart or lung problems? Do you have a weak immune system?\" (e.g., CHF, COPD, asthma, HIV positive, chemotherapy, renal failure, diabetes mellitus, sickle cell anemia)        no    Protocols used: CORONAVIRUS (COVID-19) EXPOSURE-A- 8.4.20      "

## 2020-12-08 ENCOUNTER — OFFICE VISIT (OUTPATIENT)
Dept: FAMILY MEDICINE | Facility: OTHER | Age: 45
End: 2020-12-08
Attending: NURSE PRACTITIONER
Payer: COMMERCIAL

## 2020-12-08 DIAGNOSIS — Z20.822 EXPOSURE TO 2019 NOVEL CORONAVIRUS: Primary | ICD-10-CM

## 2020-12-08 DIAGNOSIS — Z20.822 EXPOSURE TO 2019 NOVEL CORONAVIRUS: ICD-10-CM

## 2020-12-08 PROCEDURE — U0003 INFECTIOUS AGENT DETECTION BY NUCLEIC ACID (DNA OR RNA); SEVERE ACUTE RESPIRATORY SYNDROME CORONAVIRUS 2 (SARS-COV-2) (CORONAVIRUS DISEASE [COVID-19]), AMPLIFIED PROBE TECHNIQUE, MAKING USE OF HIGH THROUGHPUT TECHNOLOGIES AS DESCRIBED BY CMS-2020-01-R: HCPCS | Performed by: NURSE PRACTITIONER

## 2020-12-08 PROCEDURE — 99207 PR NO CHARGE NURSE ONLY: CPT

## 2020-12-09 LAB
SARS-COV-2 RNA SPEC QL NAA+PROBE: NOT DETECTED
SPECIMEN SOURCE: NORMAL

## 2020-12-20 ENCOUNTER — HEALTH MAINTENANCE LETTER (OUTPATIENT)
Age: 45
End: 2020-12-20

## 2021-01-04 DIAGNOSIS — F33.1 MODERATE EPISODE OF RECURRENT MAJOR DEPRESSIVE DISORDER (H): ICD-10-CM

## 2021-01-04 DIAGNOSIS — F40.10 SOCIAL ANXIETY DISORDER: ICD-10-CM

## 2021-01-06 RX ORDER — CLONAZEPAM 1 MG/1
TABLET ORAL
Qty: 90 TABLET | Refills: 0 | Status: SHIPPED | OUTPATIENT
Start: 2021-01-06 | End: 2021-02-16

## 2021-01-06 RX ORDER — VENLAFAXINE 75 MG/1
TABLET ORAL
Qty: 90 TABLET | Refills: 0 | Status: SHIPPED | OUTPATIENT
Start: 2021-01-06 | End: 2021-02-16

## 2021-01-06 NOTE — TELEPHONE ENCOUNTER
effexor 75 mg  Last Visit 6/16/20  Last Fill 10/20/20  Next Visit Not scheduled    Clonopin 1 mg  Last office visit: 6/16/20  Last refill: 6/16/20    Thank you.

## 2021-02-10 NOTE — PROGRESS NOTES
Assessment & Plan        Migraine without aura and without status migrainosus, not intractable  - Continue plan of care    Hyperlipidemia LDL goal <100  - Lipid Profile (Chol, Trig, HDL, LDL calc)  - simvastatin (ZOCOR) 40 MG tablet; 1.5 tabs daily for 60 mg    Benign essential hypertension  - Comprehensive metabolic panel (BMP + Alb, Alk Phos, ALT, AST, Total. Bili, TP)  - lisinopril (ZESTRIL) 20 MG tablet; Take 1 tablet (20 mg) by mouth daily  - metoprolol succinate ER (TOPROL-XL) 50 MG 24 hr tablet; Take 1 tablet (50 mg) by mouth 2 times daily    Moderate episode of recurrent major depressive disorder (H)  - TSH with free T4 reflex  - venlafaxine (EFFEXOR) 75 MG tablet; TAKE 1 TABLET(75 MG) BY MOUTH THREE TIMES DAILY    Social anxiety disorder  - clonazePAM (KLONOPIN) 1 MG tablet; TAKE 1 TABLET BY MOUTH THREE TIMES DAILY AS NEEDED FOR ANXIETY  - venlafaxine (EFFEXOR) 75 MG tablet; TAKE 1 TABLET(75 MG) BY MOUTH THREE TIMES DAILY      Return in about 6 months (around 8/16/2021) for Chronic disease management, am fasting.      Yelitza Roche, FRANCO  [facility] [place] Guan is a 45 year old who presents for the following health issues       Hyperlipidemia Follow-Up    Are you regularly taking any medication or supplement to lower your cholesterol?   Yes- simvastatin    Are you having muscle aches or other side effects that you think could be caused by your cholesterol lowering medication?  No      Hypertension Follow-up    Do you check your blood pressure regularly outside of the clinic? Yes     Are you following a low salt diet? Yes    Are your blood pressures ever more than 140 on the top number (systolic) OR more   than 90 on the bottom number (diastolic), for example 140/90? No      Depression and Anxiety Follow-Up    How are you doing with your depression since your last visit? No change    How are you doing with your anxiety since your last visit?  No change    Are you having other symptoms  that might be associated with depression or anxiety? No    Have you had a significant life event? Grief or Loss     Do you have any concerns with your use of alcohol or other drugs? No      Social History     Tobacco Use     Smoking status: Former Smoker     Packs/day: 0.50     Types: Cigarettes     Smokeless tobacco: Former User     Quit date: 2/13/2014   Substance Use Topics     Alcohol use: Yes     Drug use: No       PHQ 8/24/2020 8/24/2020 2/16/2021   PHQ-9 Total Score 3 2 1   Q9: Thoughts of better off dead/self-harm past 2 weeks Several days Not at all Not at all       AMRIT-7 SCORE 12/16/2019 8/24/2020 2/16/2021   Total Score 3 5 6       Last PHQ-9 2/16/2021   1.  Little interest or pleasure in doing things 0   2.  Feeling down, depressed, or hopeless 1   3.  Trouble falling or staying asleep, or sleeping too much 0   4.  Feeling tired or having little energy 0   5.  Poor appetite or overeating 0   6.  Feeling bad about yourself 0   7.  Trouble concentrating 0   8.  Moving slowly or restless 0   Q9: Thoughts of better off dead/self-harm past 2 weeks 0   PHQ-9 Total Score 1   Difficulty at work, home, or with people Not difficult at all       AMRIT-7  2/16/2021   1. Feeling nervous, anxious, or on edge 1   2. Not being able to stop or control worrying 1   3. Worrying too much about different things 1   4. Trouble relaxing 0   5. Being so restless that it is hard to sit still 0   6. Becoming easily annoyed or irritable 0   7. Feeling afraid, as if something awful might happen 3   AMRIT-7 Total Score 6   If you checked any problems, how difficult have they made it for you to do your work, take care of things at home, or get along with other people? Somewhat difficult       Suicide Assessment Five-step Evaluation and Treatment (SAFE-T)        Migraine     Since your last clinic visit, how have your headaches changed?  No change    How often are you getting headaches or migraines? 1-2 per year     Are you able to do  normal daily activities when you have a migraine? No    Are you taking rescue/relief medications? (Select all that apply) sumatriptan (Imitrex)    How helpful is your rescue/relief medication?  I get total relief    Are you taking any medications to prevent migraines? (Select all that apply)  No    In the past 4 weeks, how often have you gone to urgent care or the emergency room because of your headaches?  0        How many servings of fruits and vegetables do you eat daily?  0-1    On average, how many sweetened beverages do you drink each day (Examples: soda, juice, sweet tea, etc.  Do NOT count diet or artificially sweetened beverages)?   0    How many days per week do you exercise enough to make your heart beat faster? 3 or less    How many minutes a day do you exercise enough to make your heart beat faster? 9 or less    How many days per week do you miss taking your medication? 0            Patient Active Problem List   Diagnosis     Major depression, recurrent (H)     ED (erectile dysfunction)     Intractable chronic migraine without aura     Social anxiety disorder     ACP (advance care planning)     Benign essential hypertension     Vitamin D deficiency     Hyperlipidemia LDL goal <100     Taking a statin medication     Obesity (BMI 35.0-39.9) with comorbidity (H)     Migraine without intractable migraine     Past Surgical History:   Procedure Laterality Date     CIRCUMCISION       ORTHOPEDIC SURGERY  2003    rt puneet fx'ed and pinned     ring finger open reduction internal fixation      right       Social History     Tobacco Use     Smoking status: Former Smoker     Packs/day: 0.50     Types: Cigarettes     Smokeless tobacco: Former User     Quit date: 2/13/2014   Substance Use Topics     Alcohol use: Yes     Family History   Problem Relation Age of Onset     Hypertension Maternal Grandmother      Crohn's Disease Sister            Current Outpatient Medications   Medication Sig Dispense Refill      "aspirin 81 MG EC tablet Take 1 tablet (81 mg) by mouth daily 90 tablet 11     cholecalciferol (VITAMIN D3) 5000 UNITS TABS tablet Take 1 tablet (5,000 Units) by mouth daily 90 tablet 11     clonazePAM (KLONOPIN) 1 MG tablet TAKE 1 TABLET BY MOUTH THREE TIMES DAILY AS NEEDED FOR ANXIETY 90 tablet 1     lisinopril (ZESTRIL) 20 MG tablet Take 1 tablet (20 mg) by mouth daily 90 tablet 3     metoprolol succinate ER (TOPROL-XL) 50 MG 24 hr tablet Take 1 tablet (50 mg) by mouth 2 times daily 180 tablet 3     simvastatin (ZOCOR) 40 MG tablet 1.5 tabs daily for 60 mg 135 tablet 3     SUMAtriptan (IMITREX) 50 MG tablet Take 1 tablet (50 mg) by mouth at onset of headache for migraine TAKE 1 AT ONSET OF HEADACHE, MAY REPEAT AFTER 2 HR, NOT TO EXEED 200MG DAILY 30 tablet 3     venlafaxine (EFFEXOR) 75 MG tablet TAKE 1 TABLET(75 MG) BY MOUTH THREE TIMES DAILY 270 tablet 3       Allergies   Allergen Reactions     Erythromycin Base [Kdc:Yellow Dye+Erythromycin+Brilliant Blue Fcf]        Recent Labs   Lab Test 08/18/20  0819 08/18/20  0818 12/16/19  0934 08/20/19  0934   A1C 5.5  --  5.6  --    LDL  --  143* 138* 140*   HDL  --  70 65 64   TRIG  --  73 195* 131   ALT  --  171* 168* 114*   CR  --  0.72 0.85 0.81   GFRESTIMATED  --  >90 >90 >90   GFRESTBLACK  --  >90 >90 >90   POTASSIUM  --  3.8 4.1 4.3   TSH  --  1.25 1.26 1.07        BP Readings from Last 3 Encounters:   02/16/21 124/78   06/16/20 124/78   12/16/19 128/78    Wt Readings from Last 3 Encounters:   02/16/21 130.3 kg (287 lb 4.8 oz)   06/16/20 118.8 kg (262 lb)   12/16/19 119.7 kg (264 lb)              Review of Systems             Objective    /78   Pulse 80   Temp 98  F (36.7  C) (Tympanic)   Ht 1.803 m (5' 11\")   Wt 130.3 kg (287 lb 4.8 oz)   SpO2 97%   BMI 40.07 kg/m    Body mass index is 40.07 kg/m .         Physical Exam                 "

## 2021-02-16 ENCOUNTER — OFFICE VISIT (OUTPATIENT)
Dept: FAMILY MEDICINE | Facility: OTHER | Age: 46
End: 2021-02-16
Attending: NURSE PRACTITIONER
Payer: COMMERCIAL

## 2021-02-16 VITALS
OXYGEN SATURATION: 97 % | HEART RATE: 80 BPM | BODY MASS INDEX: 40.22 KG/M2 | DIASTOLIC BLOOD PRESSURE: 78 MMHG | WEIGHT: 287.3 LBS | SYSTOLIC BLOOD PRESSURE: 124 MMHG | TEMPERATURE: 98 F | HEIGHT: 71 IN

## 2021-02-16 DIAGNOSIS — F40.10 SOCIAL ANXIETY DISORDER: ICD-10-CM

## 2021-02-16 DIAGNOSIS — I10 BENIGN ESSENTIAL HYPERTENSION: ICD-10-CM

## 2021-02-16 DIAGNOSIS — F33.1 MODERATE EPISODE OF RECURRENT MAJOR DEPRESSIVE DISORDER (H): ICD-10-CM

## 2021-02-16 DIAGNOSIS — E78.5 HYPERLIPIDEMIA LDL GOAL <100: ICD-10-CM

## 2021-02-16 DIAGNOSIS — G43.009 MIGRAINE WITHOUT AURA AND WITHOUT STATUS MIGRAINOSUS, NOT INTRACTABLE: ICD-10-CM

## 2021-02-16 LAB
ALBUMIN SERPL-MCNC: 4.2 G/DL (ref 3.4–5)
ALP SERPL-CCNC: 145 U/L (ref 40–150)
ALT SERPL W P-5'-P-CCNC: 430 U/L (ref 0–70)
ANION GAP SERPL CALCULATED.3IONS-SCNC: 8 MMOL/L (ref 3–14)
AST SERPL W P-5'-P-CCNC: 192 U/L (ref 0–45)
BILIRUB SERPL-MCNC: 0.7 MG/DL (ref 0.2–1.3)
BUN SERPL-MCNC: 12 MG/DL (ref 7–30)
CALCIUM SERPL-MCNC: 9.4 MG/DL (ref 8.5–10.1)
CHLORIDE SERPL-SCNC: 105 MMOL/L (ref 94–109)
CHOLEST SERPL-MCNC: 204 MG/DL
CO2 SERPL-SCNC: 23 MMOL/L (ref 20–32)
CREAT SERPL-MCNC: 0.75 MG/DL (ref 0.66–1.25)
GFR SERPL CREATININE-BSD FRML MDRD: >90 ML/MIN/{1.73_M2}
GLUCOSE SERPL-MCNC: 115 MG/DL (ref 70–99)
HDLC SERPL-MCNC: 51 MG/DL
LDLC SERPL CALC-MCNC: 135 MG/DL
NONHDLC SERPL-MCNC: 153 MG/DL
POTASSIUM SERPL-SCNC: 4.1 MMOL/L (ref 3.4–5.3)
PROT SERPL-MCNC: 8.5 G/DL (ref 6.8–8.8)
SODIUM SERPL-SCNC: 136 MMOL/L (ref 133–144)
TRIGL SERPL-MCNC: 90 MG/DL
TSH SERPL DL<=0.005 MIU/L-ACNC: 1.12 MU/L (ref 0.4–4)

## 2021-02-16 PROCEDURE — 80061 LIPID PANEL: CPT | Mod: ZL | Performed by: NURSE PRACTITIONER

## 2021-02-16 PROCEDURE — 80053 COMPREHEN METABOLIC PANEL: CPT | Mod: ZL | Performed by: NURSE PRACTITIONER

## 2021-02-16 PROCEDURE — G0463 HOSPITAL OUTPT CLINIC VISIT: HCPCS | Performed by: NURSE PRACTITIONER

## 2021-02-16 PROCEDURE — 36415 COLL VENOUS BLD VENIPUNCTURE: CPT | Mod: ZL | Performed by: NURSE PRACTITIONER

## 2021-02-16 PROCEDURE — 99214 OFFICE O/P EST MOD 30 MIN: CPT | Performed by: NURSE PRACTITIONER

## 2021-02-16 PROCEDURE — 84443 ASSAY THYROID STIM HORMONE: CPT | Mod: ZL | Performed by: NURSE PRACTITIONER

## 2021-02-16 RX ORDER — METOPROLOL SUCCINATE 50 MG/1
50 TABLET, EXTENDED RELEASE ORAL 2 TIMES DAILY
Qty: 180 TABLET | Refills: 3 | Status: SHIPPED | OUTPATIENT
Start: 2021-02-16 | End: 2021-08-17

## 2021-02-16 RX ORDER — VENLAFAXINE 75 MG/1
TABLET ORAL
Qty: 270 TABLET | Refills: 3 | Status: SHIPPED | OUTPATIENT
Start: 2021-02-16 | End: 2021-08-17

## 2021-02-16 RX ORDER — CLONAZEPAM 1 MG/1
TABLET ORAL
Qty: 90 TABLET | Refills: 1 | Status: SHIPPED | OUTPATIENT
Start: 2021-02-16 | End: 2021-04-16

## 2021-02-16 RX ORDER — SIMVASTATIN 40 MG
TABLET ORAL
Qty: 135 TABLET | Refills: 3 | Status: SHIPPED | OUTPATIENT
Start: 2021-02-16 | End: 2021-08-17

## 2021-02-16 RX ORDER — LISINOPRIL 20 MG/1
20 TABLET ORAL DAILY
Qty: 90 TABLET | Refills: 3 | Status: SHIPPED | OUTPATIENT
Start: 2021-02-16 | End: 2021-08-17

## 2021-02-16 ASSESSMENT — ANXIETY QUESTIONNAIRES
6. BECOMING EASILY ANNOYED OR IRRITABLE: NOT AT ALL
4. TROUBLE RELAXING: NOT AT ALL
2. NOT BEING ABLE TO STOP OR CONTROL WORRYING: SEVERAL DAYS
5. BEING SO RESTLESS THAT IT IS HARD TO SIT STILL: NOT AT ALL
IF YOU CHECKED OFF ANY PROBLEMS ON THIS QUESTIONNAIRE, HOW DIFFICULT HAVE THESE PROBLEMS MADE IT FOR YOU TO DO YOUR WORK, TAKE CARE OF THINGS AT HOME, OR GET ALONG WITH OTHER PEOPLE: SOMEWHAT DIFFICULT
GAD7 TOTAL SCORE: 6
1. FEELING NERVOUS, ANXIOUS, OR ON EDGE: SEVERAL DAYS
7. FEELING AFRAID AS IF SOMETHING AWFUL MIGHT HAPPEN: NEARLY EVERY DAY
3. WORRYING TOO MUCH ABOUT DIFFERENT THINGS: SEVERAL DAYS

## 2021-02-16 ASSESSMENT — PATIENT HEALTH QUESTIONNAIRE - PHQ9: SUM OF ALL RESPONSES TO PHQ QUESTIONS 1-9: 1

## 2021-02-16 ASSESSMENT — MIFFLIN-ST. JEOR: SCORE: 2220.31

## 2021-02-16 ASSESSMENT — PAIN SCALES - GENERAL: PAINLEVEL: NO PAIN (0)

## 2021-02-16 NOTE — PATIENT INSTRUCTIONS
Assessment & Plan     Migraine without aura and without status migrainosus, not intractable  - Continue plan of care    Hyperlipidemia LDL goal <100  - Lipid Profile (Chol, Trig, HDL, LDL calc)  - simvastatin (ZOCOR) 40 MG tablet; 1.5 tabs daily for 60 mg    Benign essential hypertension  - Comprehensive metabolic panel (BMP + Alb, Alk Phos, ALT, AST, Total. Bili, TP)  - lisinopril (ZESTRIL) 20 MG tablet; Take 1 tablet (20 mg) by mouth daily  - metoprolol succinate ER (TOPROL-XL) 50 MG 24 hr tablet; Take 1 tablet (50 mg) by mouth 2 times daily    Moderate episode of recurrent major depressive disorder (H)  - TSH with free T4 reflex  - venlafaxine (EFFEXOR) 75 MG tablet; TAKE 1 TABLET(75 MG) BY MOUTH THREE TIMES DAILY    Social anxiety disorder  - clonazePAM (KLONOPIN) 1 MG tablet; TAKE 1 TABLET BY MOUTH THREE TIMES DAILY AS NEEDED FOR ANXIETY  - venlafaxine (EFFEXOR) 75 MG tablet; TAKE 1 TABLET(75 MG) BY MOUTH THREE TIMES DAILY      Return in about 6 months (around 8/16/2021) for Chronic disease management, am fasting.        Yelitza Roche, CNP  [facility] [place] [facility]

## 2021-02-16 NOTE — LETTER
My Migraine Action Plan      Date: 2/16/2021     My Name: Toño Gutierrez   YOB: 1975  My Pharmacy: [id number] - [place], [place] - [address]  AT [facility] [address]     My Preventive Medicine(s):  Venlafaxine (Effexor) as directed  My Rescue Medicine(s):  Sumatriptan (Imitrex) as directed My Doctor: Yelitza Roche     My Clinic: [facility]  [address] [address]  [phone]        GREEN ZONE = Good Control    My headache plan is working.   I can do what I need to do.         I WILL:     ? Keep managing my triggers.  ? Write in my migraine diary each time I have a headache.  ? Keep taking my preventive medicine daily.  ? Take my rescue medicine as needed.             YELLOW ZONE = Not Enough Control    My headache plan isn t always working.   My headaches keep me from doing   some of the things I need to do.   I WILL:     ? Set goals to control my triggers and act on them.  ? Write in my migraine diary each time I have a headache and review it for                     patterns or new triggers.  ? Keep taking my preventive medicine daily.  ? Take my rescue medicine as needed.  l Make sure I stay hydrated.  ? Call my provider or clinic if my rescue medication did not help after taking it on             at least two separate headache days  ? Call my provider or clinic if I need to use my rescue medicine more than an                  average of 2 times per week over the course of one month.               RED ZONE = Poor or No Control    My headache plan has  failed. I can t do anything  when I have one. My  medicines aren t working.   I WILL:   ? Keep taking my preventive medicine daily.  ? Make sure I stay hydrated.  ? Call my provider or clinic if my medicines are not helping or if I am not able to              keep fluids down because of nausea.  ? Let my provider or clinic know within 2 weeks if I have gone to an urgent care  or          emergency department.          Provider specific instructions:      Do not take over the counter pain relievers more than 14 days per month. This includes NSAIDS (Ibuprofen, Motrin, Advil, Naproxen, Aleve, etc), acetaminophen (Tylenol), aspirin, and Excedrin.     If you use a triptan medicine (sumatriptan, rizatriptan, frovatriptan, zolmitriptan, eletriptan etc) take it as soon as you notice the migraine starting. You can take a second dose 2 hours after the first dose if you still have any migraine symptoms.    It is fine to take 600 mg of ibuprofen (Advil) or 440 mg of naproxen (Aleve) with the triptan medicine.  Try not to use triptan medicines more than 2 days a week.    If you use your rescue medicine more than 2 times per week over the course of 1 month, set up an appointment with your provider to talk about starting a medication to prevent migraines.               Other Things I Can Do to Help My Migraines   Track Migraines and Triggers     Keep a headache journal of your symptoms. Try to track how often you have a headache, how long it lasts and what medicines you used. You can also track severity of headache.    You can use a smart phone cheryl: My Migraine Maikel. The information that you track in the cheryl can be uploaded for your provider through [employer].     You can also track your migraines on paper. The clinic can print a copy of the Migraine Diary for you. Link: [email or web address]      Lifestyle Changes 1. Try to drink enough water each day (48-70 fluid ounces a day)  2. Limit caffeine intact-one caffeinated beverage a day  3. Eat regular meals  4. Try to get cardiovascular exercise (walking, swimming, biking) 4-5 times a week  5. Try to have a routine sleep schedule going to bed at the same time each night and getting up the same time each morning with enough time to sleep in between  6. Sometimes foods can trigger migraines you can try to eliminate them if you think they make  symptoms worse: dairy, gluten, nuts (cashews, almonds), artificial sweeteners, artificial colors, high sugar content foods, certain alcohol, chocolate, and preservatives like MSG and nitrates.      Other Therapies  Talk to your doctor about adding other therapies to your headache treatment plan including:   - Cognitive behavioral therapy  - Biofeedback  - Practice therapeutic techniques at home such as Progressive Relaxation and Deep Breathing    Research suggests that combining one or more of these therapies with medication can be helpful to reduce the number and severity of migraines.     Learn More To learn more about headaches you can go to the following websites:  https://americanmigrainefoundation.org/   http://www.headaches.org/

## 2021-02-16 NOTE — NURSING NOTE
"Chief Complaint   Patient presents with     Hyperlipidemia     Hypertension     Depression     Anxiety     Headache       Initial /78   Pulse 80   Temp 98  F (36.7  C) (Tympanic)   Ht 1.803 m (5' 11\")   Wt 130.3 kg (287 lb 4.8 oz)   SpO2 97%   BMI 40.07 kg/m   Estimated body mass index is 40.07 kg/m  as calculated from the following:    Height as of this encounter: 1.803 m (5' 11\").    Weight as of this encounter: 130.3 kg (287 lb 4.8 oz).  Medication Reconciliation: complete  Isabela Hewitt LPN  "

## 2021-02-16 NOTE — LETTER
My Depression Action Plan  Name: Toño Gutierrez   Date of Birth 1975  Date: 2/16/2021    My doctor: Yelitza Roche   My clinic: [facility]  [address]  [place] [address]  [phone]          GREEN    ZONE   Good Control    What it looks like:     Things are going generally well. You have normal ups and downs. You may even feel depressed from time to time, but bad moods usually last less than a day.   What you need to do:  1. Continue to care for yourself (see self care plan)  2. Check your depression survival kit and update it as needed  3. Follow your physician s recommendations including any medication.  4. Do not stop taking medication unless you consult with your physician first.           YELLOW         ZONE Getting Worse    What it looks like:     Depression is starting to interfere with your life.     It may be hard to get out of bed; you may be starting to isolate yourself from others.    Symptoms of depression are starting to last most all day and this has happened for several days.     You may have suicidal thoughts but they are not constant.   What you need to do:     1. Call your care team. Your response to treatment will improve if you keep your care team informed of your progress. Yellow periods are signs an adjustment may need to be made.     2. Continue your self-care.  Just get dressed and ready for the day.  Don't give yourself time to talk yourself out of it.    3. Talk to someone in your support network.    4. Open up your Depression Self-Care Plan/Wellness Kit.           RED    ZONE Medical Alert - Get Help    What it looks like:     Depression is seriously interfering with your life.     You may experience these or other symptoms: You can t get out of bed most days, can t work or engage in other necessary activities, you have trouble taking care of basic hygiene, or basic responsibilities, thoughts of suicide or death that will not  go away, self-injurious behavior.     What you need to do:  1. Call your care team and request a same-day appointment. If they are not available (weekends or after hours) call your local crisis line, emergency room or 911.          Depression Self-Care Plan / Wellness Kit    Many people find that medication and therapy are helpful treatments for managing depression. In addition, making small changes to your everyday life can help to boost your mood and improve your wellbeing. Below are some tips for you to consider. Be sure to talk with your medical provider and/or behavioral health consultant if your symptoms are worsening or not improving.     Sleep   Sleep hygiene  means all of the habits that support good, restful sleep. It includes maintaining a consistent bedtime and wake time, using your bedroom only for sleeping or sex, and keeping the bedroom dark and free of distractions like a computer, smartphone, or television.     Develop a Healthy Routine  Maintain good hygiene. Get out of bed in the morning, make your bed, brush your teeth, take a shower, and get dressed. Don t spend too much time viewing media that makes you feel stressed. Find time to relax each day.    Exercise  Get some form of exercise every day. This will help reduce pain and release endorphins, the  feel good  chemicals in your brain. It can be as simple as just going for a walk or doing some gardening, anything that will get you moving.      Diet  Strive to eat healthy foods, including fruits and vegetables. Drink plenty of water. Avoid excessive sugar, caffeine, alcohol, and other mood-altering substances.     Stay Connected with Others  Stay in touch with friends and family members.    Manage Your Mood  Try deep breathing, massage therapy, biofeedback, or meditation. Take part in fun activities when you can. Try to find something to smile about each day.     Psychotherapy  Be open to working with a therapist if your provider recommends it.      Medication  Be sure to take your medication as prescribed. Most anti-depressants need to be taken every day. It usually takes several weeks for medications to work. Not all medicines work for all people. It is important to follow-up with your provider to make sure you have a treatment plan that is working for you. Do not stop your medication abruptly without first discussing it with your provider.    Crisis Resources   These hotlines are for both adults and children. They and are open 24 hours a day, 7 days a week unless noted otherwise.      National Suicide Prevention Lifeline   5-604-080-TALK (9102)      Crisis Text Line    www.crisistextline.org  Text HOME to 794991 from anywhere in the [place], anytime, about any type of crisis. A live, trained crisis counselor will receive the text and respond quickly.      Andre Lifeline for LGBTQ Youth  A national crisis intervention and suicide lifeline for LGBTQ youth under 25. Provides a safe place to talk without judgement. Call 1-574.701.1792; text START to 497428 or visit www.thetrevorproject.org to talk to a trained counselor.      For [facility] crisis numbers, visit the [facility] website at:  https://[place].gov/dhs/people-we-serve/adults/health-care/mental-health/resources/crisis-contacts.jsp

## 2021-02-17 DIAGNOSIS — R79.89 LFT ELEVATION: Primary | ICD-10-CM

## 2021-02-17 ASSESSMENT — ANXIETY QUESTIONNAIRES: GAD7 TOTAL SCORE: 6

## 2021-02-23 ENCOUNTER — TELEPHONE (OUTPATIENT)
Dept: FAMILY MEDICINE | Facility: OTHER | Age: 46
End: 2021-02-23

## 2021-04-15 DIAGNOSIS — F40.10 SOCIAL ANXIETY DISORDER: ICD-10-CM

## 2021-04-16 RX ORDER — CLONAZEPAM 1 MG/1
TABLET ORAL
Qty: 90 TABLET | Refills: 0 | Status: SHIPPED | OUTPATIENT
Start: 2021-04-16 | End: 2021-05-18

## 2021-04-16 NOTE — TELEPHONE ENCOUNTER
clonazePAM (KLONOPIN) 1 MG tablet    Last Written Prescription Date:  02/16/2021  Last Fill Quantity: 90,   # refills: 1  Last Office Visit: 02/16/21  Future Office visit:       Routing refill request to provider for review/approval because:

## 2021-05-17 DIAGNOSIS — F40.10 SOCIAL ANXIETY DISORDER: ICD-10-CM

## 2021-05-18 RX ORDER — CLONAZEPAM 1 MG/1
TABLET ORAL
Qty: 90 TABLET | Refills: 0 | Status: SHIPPED | OUTPATIENT
Start: 2021-05-18 | End: 2021-06-30

## 2021-05-18 NOTE — TELEPHONE ENCOUNTER
clonazepam  Last Written Prescription Date: 4/16/21  Last Fill Quantity: 90 # of Refills: 0  Last Office Visit: 2/16/21

## 2021-06-29 DIAGNOSIS — F40.10 SOCIAL ANXIETY DISORDER: ICD-10-CM

## 2021-06-30 RX ORDER — CLONAZEPAM 1 MG/1
TABLET ORAL
Qty: 90 TABLET | Refills: 1 | Status: SHIPPED | OUTPATIENT
Start: 2021-06-30 | End: 2021-08-17

## 2021-07-22 NOTE — PROGRESS NOTES
"    Assessment & Plan       Hyperlipidemia LDL goal <100  - simvastatin (ZOCOR) 40 MG tablet; 1.5 tabs daily for 60 mg  - Lipid Profile (Chol, Trig, HDL, LDL calc)  - Comprehensive metabolic panel    Benign essential hypertension  - TSH with free T4 reflex; Future  - lisinopril (ZESTRIL) 20 MG tablet; Take 1 tablet (20 mg) by mouth daily  - metoprolol succinate ER (TOPROL-XL) 50 MG 24 hr tablet; Take 1 tablet (50 mg) by mouth 2 times daily  - TSH with free T4 reflex  - Comprehensive metabolic panel    Migraine without aura and without status migrainosus, not intractable  - Follow-up as needed    Moderate episode of recurrent major depressive disorder (H)  - venlafaxine (EFFEXOR) 75 MG tablet; TAKE 1 TABLET(75 MG) BY MOUTH THREE TIMES DAILY    Social anxiety disorder  - clonazePAM (KLONOPIN) 1 MG tablet; TAKE 1 TABLET BY MOUTH THREE TIMES DAILY AS NEEDED FOR ANXIETY  - venlafaxine (EFFEXOR) 75 MG tablet; TAKE 1 TABLET(75 MG) BY MOUTH THREE TIMES DAILY        BMI:   Estimated body mass index is 40.15 kg/m  as calculated from the following:    Height as of 2/16/21: 1.803 m (5' 11\").    Weight as of this encounter: 130.6 kg (287 lb 14.4 oz).       Return in about 4 weeks (around 9/14/2021), or virtual visit - Mood.      Yelitza Roche, FRANCO  [facility] - SEGUN Guan is a 46 year old who presents for the following health issues       Hyperlipidemia Follow-Up    Are you regularly taking any medication or supplement to lower your cholesterol?   Yes- simvastatin 40 mg daily    Are you having muscle aches or other side effects that you think could be caused by your cholesterol lowering medication?  No      Hypertension Follow-up    Do you check your blood pressure regularly outside of the clinic? No     Are you following a low salt diet? Yes    Are your blood pressures ever more than 140 on the top number (systolic) OR more   than 90 on the bottom number (diastolic), for example 140/90? No      Depression and " Anxiety Follow-Up    How are you doing with your depression since your last visit? Worsened     How are you doing with your anxiety since your last visit?  Worsened     Are you having other symptoms that might be associated with depression or anxiety? No    Have you had a significant life event? Relationship Concerns and Grief or Loss     Do you have any concerns with your use of alcohol or other drugs? No       Lost his Grandmother  Lost his dog  Lost a friend  Lost 2 guinea pigs  Lost another dog  Lost a resident memorial day  Work demands        Social History     Tobacco Use     Smoking status: Former Smoker     Packs/day: 0.50     Types: Cigarettes     Smokeless tobacco: Former User     Quit date: 2/13/2014   Substance Use Topics     Alcohol use: Yes     Drug use: No       PHQ 8/24/2020 2/16/2021 8/17/2021   PHQ-9 Total Score 2 1 9   Q9: Thoughts of better off dead/self-harm past 2 weeks Not at all Not at all Several days       AMRIT-7 SCORE 8/24/2020 2/16/2021 8/17/2021   Total Score 5 6 13         Migraine     Since your last clinic visit, how have your headaches changed?  Improved    How often are you getting headaches or migraines? 1-2 a year     Are you able to do normal daily activities when you have a migraine? Yes    Are you taking rescue/relief medications? (Select all that apply) sumatriptan, but has not had to take for a long time.     How helpful is your rescue/relief medication?  I get total relief    Are you taking any medications to prevent migraines? (Select all that apply)  No    In the past 4 weeks, how often have you gone to urgent care or the emergency room because of your headaches?  0      Patient Active Problem List   Diagnosis     Major depression, recurrent (H)     ED (erectile dysfunction)     Intractable chronic migraine without aura     Social anxiety disorder     ACP (advance care planning)     Benign essential hypertension     Vitamin D deficiency     Hyperlipidemia LDL goal <100      Taking a statin medication     Obesity (BMI 35.0-39.9) with comorbidity (H)     Migraine without intractable migraine     Past Surgical History:   Procedure Laterality Date     CIRCUMCISION       ORTHOPEDIC SURGERY  2003    rt ruthie teixeira'ed and pinned     ring finger open reduction internal fixation      right       Social History     Tobacco Use     Smoking status: Former Smoker     Packs/day: 0.50     Types: Cigarettes     Smokeless tobacco: Former User     Quit date: 2/13/2014   Substance Use Topics     Alcohol use: Yes     Family History   Problem Relation Age of Onset     Hypertension Maternal Grandmother      Crohn's Disease Sister              Current Outpatient Medications   Medication Sig Dispense Refill     aspirin 81 MG EC tablet Take 1 tablet (81 mg) by mouth daily 90 tablet 11     betamethasone dipropionate (DIPROSONE) 0.05 % external cream APPLY TOPICALLY TO THE AFFECTED AREA EVERY NIGHT AT BEDTIME FOR 3 WEEKS       cholecalciferol (VITAMIN D3) 5000 UNITS TABS tablet Take 1 tablet (5,000 Units) by mouth daily 90 tablet 11     clonazePAM (KLONOPIN) 1 MG tablet TAKE 1 TABLET BY MOUTH THREE TIMES DAILY AS NEEDED FOR ANXIETY 90 tablet 1     lisinopril (ZESTRIL) 20 MG tablet Take 1 tablet (20 mg) by mouth daily 90 tablet 3     metoprolol succinate ER (TOPROL-XL) 50 MG 24 hr tablet Take 1 tablet (50 mg) by mouth 2 times daily 180 tablet 3     simvastatin (ZOCOR) 40 MG tablet 1.5 tabs daily for 60 mg 135 tablet 3     venlafaxine (EFFEXOR) 75 MG tablet TAKE 1 TABLET(75 MG) BY MOUTH THREE TIMES DAILY 270 tablet 3       Allergies   Allergen Reactions     Erythromycin Base [Kdc:Yellow Dye+Erythromycin+Brilliant Blue Fcf]        Recent Labs   Lab Test 02/16/21  1044 08/18/20  0819 08/18/20  0818 12/16/19  0934   A1C  --  5.5  --  5.6   *  --  143* 138*   HDL 51  --  70 65   TRIG 90  --  73 195*   *  --  171* 168*   CR 0.75  --  0.72 0.85   GFRESTIMATED >90  --  >90 >90   GFRESTBLACK >90  --  >90  >90   POTASSIUM 4.1  --  3.8 4.1   TSH 1.12  --  1.25 1.26        BP Readings from Last 3 Encounters:   08/17/21 128/78   02/16/21 124/78   06/16/20 124/78    Wt Readings from Last 3 Encounters:   08/17/21 130.6 kg (287 lb 14.4 oz)   02/16/21 130.3 kg (287 lb 4.8 oz)   06/16/20 118.8 kg (262 lb)           Review of Systems   Constitutional, HEENT, cardiovascular, pulmonary, gi and gu systems are negative, except as otherwise noted.        Objective    /78 (BP Location: Left arm, Patient Position: Sitting, Cuff Size: Adult Large)   Pulse 71   Temp (!) 96.7  F (35.9  C) (Tympanic)   Resp 18   Wt 130.6 kg (287 lb 14.4 oz)   SpO2 98%   BMI 40.15 kg/m    Body mass index is 40.15 kg/m .       Physical Exam   GENERAL: healthy, alert and no distress  EYES: Eyes grossly normal to inspection, PERRL and conjunctivae and sclerae normal  HENT: ear canals and TM's normal, nose and mouth without ulcers or lesions  NECK: no adenopathy, no asymmetry, masses, or scars and thyroid normal to palpation  RESP: lungs clear to auscultation - no rales, rhonchi or wheezes  CV: regular rate and rhythm, normal S1 S2, no S3 or S4, no murmur, click or rub, no peripheral edema and peripheral pulses strong  ABDOMEN: soft, nontender, no hepatosplenomegaly, no masses and bowel sounds normal  MS: no gross musculoskeletal defects noted, no edema  SKIN: no suspicious lesions or rashes  PSYCH: affect flat

## 2021-08-17 ENCOUNTER — OFFICE VISIT (OUTPATIENT)
Dept: FAMILY MEDICINE | Facility: OTHER | Age: 46
End: 2021-08-17
Attending: NURSE PRACTITIONER
Payer: COMMERCIAL

## 2021-08-17 VITALS
OXYGEN SATURATION: 98 % | SYSTOLIC BLOOD PRESSURE: 128 MMHG | WEIGHT: 287.9 LBS | TEMPERATURE: 96.7 F | RESPIRATION RATE: 18 BRPM | BODY MASS INDEX: 40.15 KG/M2 | HEART RATE: 71 BPM | DIASTOLIC BLOOD PRESSURE: 78 MMHG

## 2021-08-17 DIAGNOSIS — F33.1 MODERATE EPISODE OF RECURRENT MAJOR DEPRESSIVE DISORDER (H): ICD-10-CM

## 2021-08-17 DIAGNOSIS — I10 BENIGN ESSENTIAL HYPERTENSION: ICD-10-CM

## 2021-08-17 DIAGNOSIS — F40.10 SOCIAL ANXIETY DISORDER: ICD-10-CM

## 2021-08-17 DIAGNOSIS — E78.5 HYPERLIPIDEMIA LDL GOAL <100: Primary | ICD-10-CM

## 2021-08-17 DIAGNOSIS — G43.009 MIGRAINE WITHOUT AURA AND WITHOUT STATUS MIGRAINOSUS, NOT INTRACTABLE: ICD-10-CM

## 2021-08-17 LAB
ALBUMIN SERPL-MCNC: 4.3 G/DL (ref 3.4–5)
ALP SERPL-CCNC: 138 U/L (ref 40–150)
ALT SERPL W P-5'-P-CCNC: 252 U/L (ref 0–70)
ANION GAP SERPL CALCULATED.3IONS-SCNC: 7 MMOL/L (ref 3–14)
AST SERPL W P-5'-P-CCNC: 114 U/L (ref 0–45)
BILIRUB SERPL-MCNC: 0.7 MG/DL (ref 0.2–1.3)
BUN SERPL-MCNC: 16 MG/DL (ref 7–30)
CALCIUM SERPL-MCNC: 9.4 MG/DL (ref 8.5–10.1)
CHLORIDE BLD-SCNC: 104 MMOL/L (ref 94–109)
CHOLEST SERPL-MCNC: 255 MG/DL
CO2 SERPL-SCNC: 25 MMOL/L (ref 20–32)
CREAT SERPL-MCNC: 0.8 MG/DL (ref 0.66–1.25)
FASTING STATUS PATIENT QL REPORTED: YES
GFR SERPL CREATININE-BSD FRML MDRD: >90 ML/MIN/1.73M2
GLUCOSE BLD-MCNC: 115 MG/DL (ref 70–99)
HDLC SERPL-MCNC: 57 MG/DL
LDLC SERPL CALC-MCNC: 154 MG/DL
NONHDLC SERPL-MCNC: 198 MG/DL
POTASSIUM BLD-SCNC: 4 MMOL/L (ref 3.4–5.3)
PROT SERPL-MCNC: 8.5 G/DL (ref 6.8–8.8)
SODIUM SERPL-SCNC: 136 MMOL/L (ref 133–144)
TRIGL SERPL-MCNC: 218 MG/DL
TSH SERPL DL<=0.005 MIU/L-ACNC: 1.97 MU/L (ref 0.4–4)

## 2021-08-17 PROCEDURE — 84443 ASSAY THYROID STIM HORMONE: CPT | Mod: ZL | Performed by: NURSE PRACTITIONER

## 2021-08-17 PROCEDURE — 36415 COLL VENOUS BLD VENIPUNCTURE: CPT | Mod: ZL | Performed by: NURSE PRACTITIONER

## 2021-08-17 PROCEDURE — 99214 OFFICE O/P EST MOD 30 MIN: CPT | Performed by: NURSE PRACTITIONER

## 2021-08-17 PROCEDURE — 80053 COMPREHEN METABOLIC PANEL: CPT | Mod: ZL | Performed by: NURSE PRACTITIONER

## 2021-08-17 PROCEDURE — G0463 HOSPITAL OUTPT CLINIC VISIT: HCPCS

## 2021-08-17 PROCEDURE — 82465 ASSAY BLD/SERUM CHOLESTEROL: CPT | Mod: ZL | Performed by: NURSE PRACTITIONER

## 2021-08-17 RX ORDER — VENLAFAXINE 75 MG/1
TABLET ORAL
Qty: 270 TABLET | Refills: 3 | Status: SHIPPED | OUTPATIENT
Start: 2021-08-17 | End: 2022-02-09

## 2021-08-17 RX ORDER — LISINOPRIL 20 MG/1
20 TABLET ORAL DAILY
Qty: 90 TABLET | Refills: 3 | Status: SHIPPED | OUTPATIENT
Start: 2021-08-17 | End: 2022-02-09

## 2021-08-17 RX ORDER — CLONAZEPAM 1 MG/1
TABLET ORAL
Qty: 90 TABLET | Refills: 1 | Status: SHIPPED | OUTPATIENT
Start: 2021-08-17 | End: 2021-11-01

## 2021-08-17 RX ORDER — SIMVASTATIN 40 MG
TABLET ORAL
Qty: 135 TABLET | Refills: 3 | Status: SHIPPED | OUTPATIENT
Start: 2021-08-17 | End: 2022-02-09

## 2021-08-17 RX ORDER — SUMATRIPTAN 50 MG/1
50 TABLET, FILM COATED ORAL
Qty: 30 TABLET | Refills: 3 | Status: CANCELLED | OUTPATIENT
Start: 2021-08-17

## 2021-08-17 RX ORDER — BETAMETHASONE DIPROPIONATE 0.5 MG/G
CREAM TOPICAL
COMMUNITY
Start: 2021-08-03 | End: 2022-02-09

## 2021-08-17 RX ORDER — METOPROLOL SUCCINATE 50 MG/1
50 TABLET, EXTENDED RELEASE ORAL 2 TIMES DAILY
Qty: 180 TABLET | Refills: 3 | Status: SHIPPED | OUTPATIENT
Start: 2021-08-17 | End: 2022-02-09

## 2021-08-17 ASSESSMENT — ANXIETY QUESTIONNAIRES
IF YOU CHECKED OFF ANY PROBLEMS ON THIS QUESTIONNAIRE, HOW DIFFICULT HAVE THESE PROBLEMS MADE IT FOR YOU TO DO YOUR WORK, TAKE CARE OF THINGS AT HOME, OR GET ALONG WITH OTHER PEOPLE: SOMEWHAT DIFFICULT
1. FEELING NERVOUS, ANXIOUS, OR ON EDGE: MORE THAN HALF THE DAYS
5. BEING SO RESTLESS THAT IT IS HARD TO SIT STILL: SEVERAL DAYS
2. NOT BEING ABLE TO STOP OR CONTROL WORRYING: MORE THAN HALF THE DAYS
7. FEELING AFRAID AS IF SOMETHING AWFUL MIGHT HAPPEN: NEARLY EVERY DAY
GAD7 TOTAL SCORE: 13
6. BECOMING EASILY ANNOYED OR IRRITABLE: SEVERAL DAYS
4. TROUBLE RELAXING: MORE THAN HALF THE DAYS
3. WORRYING TOO MUCH ABOUT DIFFERENT THINGS: MORE THAN HALF THE DAYS

## 2021-08-17 ASSESSMENT — PATIENT HEALTH QUESTIONNAIRE - PHQ9: SUM OF ALL RESPONSES TO PHQ QUESTIONS 1-9: 9

## 2021-08-17 ASSESSMENT — PAIN SCALES - GENERAL: PAINLEVEL: NO PAIN (0)

## 2021-08-17 NOTE — NURSING NOTE
"Chief Complaint   Patient presents with     Chronic Disease Management       Initial /80 (BP Location: Left arm, Patient Position: Sitting, Cuff Size: Adult Large)   Pulse 71   Temp (!) 96.7  F (35.9  C) (Tympanic)   Resp 18   Wt 130.6 kg (287 lb 14.4 oz)   SpO2 98%   BMI 40.15 kg/m   Estimated body mass index is 40.15 kg/m  as calculated from the following:    Height as of 2/16/21: 1.803 m (5' 11\").    Weight as of this encounter: 130.6 kg (287 lb 14.4 oz).  Medication Reconciliation: complete  Odilia Linton LPN  "

## 2021-08-17 NOTE — PATIENT INSTRUCTIONS
"    Assessment & Plan       Hyperlipidemia LDL goal <100  - simvastatin (ZOCOR) 40 MG tablet; 1.5 tabs daily for 60 mg  - Lipid Profile (Chol, Trig, HDL, LDL calc)  - Comprehensive metabolic panel    Benign essential hypertension  - TSH with free T4 reflex; Future  - lisinopril (ZESTRIL) 20 MG tablet; Take 1 tablet (20 mg) by mouth daily  - metoprolol succinate ER (TOPROL-XL) 50 MG 24 hr tablet; Take 1 tablet (50 mg) by mouth 2 times daily  - TSH with free T4 reflex  - Comprehensive metabolic panel    Migraine without aura and without status migrainosus, not intractable  - Follow-up as needed    Moderate episode of recurrent major depressive disorder (H)  - venlafaxine (EFFEXOR) 75 MG tablet; TAKE 1 TABLET(75 MG) BY MOUTH THREE TIMES DAILY    Social anxiety disorder  - clonazePAM (KLONOPIN) 1 MG tablet; TAKE 1 TABLET BY MOUTH THREE TIMES DAILY AS NEEDED FOR ANXIETY  - venlafaxine (EFFEXOR) 75 MG tablet; TAKE 1 TABLET(75 MG) BY MOUTH THREE TIMES DAILY        BMI:   Estimated body mass index is 40.15 kg/m  as calculated from the following:    Height as of 2/16/21: 1.803 m (5' 11\").    Weight as of this encounter: 130.6 kg (287 lb 14.4 oz).       Return in about 4 weeks (around 9/14/2021), or virtual visit - Mood.      Yelitza Roche CNP  [facility] [place]  "

## 2021-08-18 ASSESSMENT — ANXIETY QUESTIONNAIRES: GAD7 TOTAL SCORE: 13

## 2021-10-03 ENCOUNTER — HEALTH MAINTENANCE LETTER (OUTPATIENT)
Age: 46
End: 2021-10-03

## 2021-11-01 DIAGNOSIS — F40.10 SOCIAL ANXIETY DISORDER: ICD-10-CM

## 2021-11-01 RX ORDER — CLONAZEPAM 1 MG/1
TABLET ORAL
Qty: 90 TABLET | Refills: 1 | Status: SHIPPED | OUTPATIENT
Start: 2021-11-01 | End: 2022-02-09

## 2022-01-23 ENCOUNTER — HEALTH MAINTENANCE LETTER (OUTPATIENT)
Age: 47
End: 2022-01-23

## 2022-02-08 NOTE — PROGRESS NOTES
Assessment & Plan       Benign essential hypertension  - TSH with free T4 reflex; Future  - metoprolol succinate ER (TOPROL-XL) 50 MG 24 hr tablet; Take 1 tablet (50 mg) by mouth 2 times daily  - lisinopril (ZESTRIL) 30 MG tablet; Take 1 tablet (30 mg) by mouth daily  - TSH with free T4 reflex  - CBC with platelets and differential  - Comprehensive metabolic panel      Hyperlipidemia LDL goal <100  - simvastatin (ZOCOR) 40 MG tablet; 1.5 tabs daily for 60 mg  - Comprehensive metabolic panel  - Lipid Profile (Chol, Trig, HDL, LDL calc)      Migraine without aura and without status migrainosus, not intractable  - Follow-up as needed      Social anxiety disorder  - venlafaxine (EFFEXOR) 75 MG tablet; TAKE 1 TABLET(75 MG) BY MOUTH THREE TIMES DAILY  - clonazePAM (KLONOPIN) 1 MG tablet; TAKE 1 TABLET BY MOUTH THREE TIMES DAILY PRN FOR ANXIETY      Mild episode of recurrent major depressive disorder (H)  - venlafaxine (EFFEXOR) 75 MG tablet; TAKE 1 TABLET(75 MG) BY MOUTH THREE TIMES DAILY      ACP (advance care planning)  - Discussed      Vitamin D deficiency  - Vitamin D level  - D3 5000 U daily        Read up on intermittent fasting  Increase activity level      45  minutes spent on the date of the encounter doing chart review, review of outside records, review of test results, interpretation of tests, patient visit and documentation          Return in about 6 months (around 8/9/2022).      Yelitza Roche, FRANCO  [facility] - SEGUN Guan is a 46 year old who presents for the following health issues       Hyperlipidemia Follow-Up    Are you regularly taking any medication or supplement to lower your cholesterol?   Yes- simvastatin 40 mg daily    Are you having muscle aches or other side effects that you think could be caused by your cholesterol lowering medication?  No      Hypertension Follow-up    Do you check your blood pressure regularly outside of the clinic? Yes     Are you following a low salt  diet? Yes    Are your blood pressures ever more than 140 on the top number (systolic) OR more   than 90 on the bottom number (diastolic), for example 140/90? Yes      Depression and Anxiety Follow-Up    How are you doing with your depression since your last visit? Worsened     How are you doing with your anxiety since your last visit?  Worsened     Are you having other symptoms that might be associated with depression or anxiety? No    Have you had a significant life event? Grief or Loss     Do you have any concerns with your use of alcohol or other drugs? No       Social History     Tobacco Use     Smoking status: Former Smoker     Packs/day: 0.50     Types: Cigarettes     Smokeless tobacco: Former User     Quit date: 2/13/2014   Substance Use Topics     Alcohol use: Yes     Drug use: No           PHQ 2/16/2021 8/17/2021 2/9/2022   PHQ-9 Total Score 1 9 15   Q9: Thoughts of better off dead/self-harm past 2 weeks Not at all Several days Several days         AMRIT-7 SCORE 2/16/2021 8/17/2021 2/9/2022   Total Score 6 13 19           Migraine     Since your last clinic visit, how have your headaches changed?  Improved    How often are you getting headaches or migraines? Has not had one in a year           Patient Active Problem List   Diagnosis     Major depression, recurrent (H)     ED (erectile dysfunction)     Social anxiety disorder     ACP (advance care planning)     Benign essential hypertension     Vitamin D deficiency     Hyperlipidemia LDL goal <100     Taking a statin medication     Obesity (BMI 35.0-39.9) with comorbidity (H)     Migraine without intractable migraine     Granuloma annulare     Past Surgical History:   Procedure Laterality Date     CIRCUMCISION       ORTHOPEDIC SURGERY  2003    rt puneet fx'ed and pinned     ring finger open reduction internal fixation      right       Social History     Tobacco Use     Smoking status: Former Smoker     Packs/day: 0.50     Types: Cigarettes     Smokeless  tobacco: Former User     Quit date: 2/13/2014   Substance Use Topics     Alcohol use: Yes     Family History   Problem Relation Age of Onset     Hypertension Maternal Grandmother      Crohn's Disease Sister              Current Outpatient Medications   Medication Sig Dispense Refill     aspirin 81 MG EC tablet Take 1 tablet (81 mg) by mouth daily 90 tablet 11     betamethasone dipropionate (DIPROSONE) 0.05 % external cream APPLY TOPICALLY TO THE AFFECTED AREA EVERY NIGHT AT BEDTIME until clear 45 g 3     cholecalciferol (VITAMIN D3) 5000 UNITS TABS tablet Take 1 tablet (5,000 Units) by mouth daily 90 tablet 11     clonazePAM (KLONOPIN) 1 MG tablet TAKE 1 TABLET BY MOUTH THREE TIMES DAILY PRN FOR ANXIETY 90 tablet 1     lisinopril (ZESTRIL) 30 MG tablet Take 1 tablet (30 mg) by mouth daily 90 tablet 1     metoprolol succinate ER (TOPROL-XL) 50 MG 24 hr tablet Take 1 tablet (50 mg) by mouth 2 times daily 180 tablet 1     simvastatin (ZOCOR) 40 MG tablet 1.5 tabs daily for 60 mg 135 tablet 1     venlafaxine (EFFEXOR) 75 MG tablet TAKE 1 TABLET(75 MG) BY MOUTH THREE TIMES DAILY 270 tablet 1         Allergies   Allergen Reactions     Erythromycin Base [Kdc:Yellow Dye+Erythromycin+Brilliant Blue Fcf]        Recent Labs   Lab Test 08/17/21  0958 02/16/21  1044 08/18/20  0819 08/18/20  0818 12/16/19  0934   A1C  --   --  5.5  --  5.6   * 135*  --  143* 138*   HDL 57 51  --  70 65   TRIG 218* 90  --  73 195*   * 430*  --  171* 168*   CR 0.80 0.75  --  0.72 0.85   GFRESTIMATED >90 >90  --  >90 >90   GFRESTBLACK  --  >90  --  >90 >90   POTASSIUM 4.0 4.1  --  3.8 4.1   TSH 1.97 1.12  --  1.25 1.26          BP Readings from Last 3 Encounters:   02/09/22 132/88   08/17/21 128/78   02/16/21 124/78    Wt Readings from Last 3 Encounters:   02/09/22 138.1 kg (304 lb 6.4 oz)   08/17/21 130.6 kg (287 lb 14.4 oz)   02/16/21 130.3 kg (287 lb 4.8 oz)              Review of Systems   Constitutional, HEENT, cardiovascular,  pulmonary, GI, , musculoskeletal, neuro, skin, endocrine and psych systems are negative, except as otherwise noted.          Objective    /88 (BP Location: Left arm, Patient Position: Sitting, Cuff Size: Adult Large)   Pulse 84   Temp 97.4  F (36.3  C) (Tympanic)   Resp 20   Wt 138.1 kg (304 lb 6.4 oz)   SpO2 95%   BMI 42.46 kg/m    Body mass index is 42.46 kg/m .         Physical Exam   GENERAL: healthy, alert and no distress  EYES: Eyes grossly normal to inspection, PERRL and conjunctivae and sclerae normal  HENT: ear canals and TM's normal, nose and mouth without ulcers or lesions  NECK: no adenopathy, no asymmetry, masses, or scars and thyroid normal to palpation  RESP: lungs clear to auscultation - no rales, rhonchi or wheezes  CV: regular rate and rhythm, normal S1 S2, no S3 or S4, no murmur, click or rub, no peripheral edema and peripheral pulses strong  SKIN: no suspicious lesions or rashes  PSYCH: anxious

## 2022-02-09 ENCOUNTER — OFFICE VISIT (OUTPATIENT)
Dept: FAMILY MEDICINE | Facility: OTHER | Age: 47
End: 2022-02-09
Attending: NURSE PRACTITIONER
Payer: COMMERCIAL

## 2022-02-09 VITALS
OXYGEN SATURATION: 95 % | RESPIRATION RATE: 20 BRPM | DIASTOLIC BLOOD PRESSURE: 88 MMHG | BODY MASS INDEX: 42.46 KG/M2 | WEIGHT: 304.4 LBS | HEART RATE: 84 BPM | SYSTOLIC BLOOD PRESSURE: 132 MMHG | TEMPERATURE: 97.4 F

## 2022-02-09 DIAGNOSIS — E78.5 HYPERLIPIDEMIA LDL GOAL <100: ICD-10-CM

## 2022-02-09 DIAGNOSIS — F33.0 MILD EPISODE OF RECURRENT MAJOR DEPRESSIVE DISORDER (H): ICD-10-CM

## 2022-02-09 DIAGNOSIS — I10 BENIGN ESSENTIAL HYPERTENSION: Primary | ICD-10-CM

## 2022-02-09 DIAGNOSIS — G43.009 MIGRAINE WITHOUT AURA AND WITHOUT STATUS MIGRAINOSUS, NOT INTRACTABLE: ICD-10-CM

## 2022-02-09 DIAGNOSIS — E55.9 VITAMIN D DEFICIENCY: ICD-10-CM

## 2022-02-09 DIAGNOSIS — Z71.89 ACP (ADVANCE CARE PLANNING): ICD-10-CM

## 2022-02-09 DIAGNOSIS — L92.0 GRANULOMA ANNULARE: ICD-10-CM

## 2022-02-09 DIAGNOSIS — F40.10 SOCIAL ANXIETY DISORDER: ICD-10-CM

## 2022-02-09 LAB
ALBUMIN SERPL-MCNC: 4.1 G/DL (ref 3.4–5)
ALP SERPL-CCNC: 128 U/L (ref 40–150)
ALT SERPL W P-5'-P-CCNC: 446 U/L (ref 0–70)
ANION GAP SERPL CALCULATED.3IONS-SCNC: 6 MMOL/L (ref 3–14)
AST SERPL W P-5'-P-CCNC: 248 U/L (ref 0–45)
BASOPHILS # BLD AUTO: 0.1 10E3/UL (ref 0–0.2)
BASOPHILS NFR BLD AUTO: 1 %
BILIRUB SERPL-MCNC: 0.6 MG/DL (ref 0.2–1.3)
BUN SERPL-MCNC: 13 MG/DL (ref 7–30)
CALCIUM SERPL-MCNC: 9.6 MG/DL (ref 8.5–10.1)
CHLORIDE BLD-SCNC: 105 MMOL/L (ref 94–109)
CHOLEST SERPL-MCNC: 208 MG/DL
CO2 SERPL-SCNC: 25 MMOL/L (ref 20–32)
CREAT SERPL-MCNC: 0.75 MG/DL (ref 0.66–1.25)
EOSINOPHIL # BLD AUTO: 0.2 10E3/UL (ref 0–0.7)
EOSINOPHIL NFR BLD AUTO: 4 %
ERYTHROCYTE [DISTWIDTH] IN BLOOD BY AUTOMATED COUNT: 12.8 % (ref 10–15)
FASTING STATUS PATIENT QL REPORTED: YES
GFR SERPL CREATININE-BSD FRML MDRD: >90 ML/MIN/1.73M2
GLUCOSE BLD-MCNC: 121 MG/DL (ref 70–99)
HCT VFR BLD AUTO: 44.6 % (ref 40–53)
HDLC SERPL-MCNC: 55 MG/DL
HGB BLD-MCNC: 14.7 G/DL (ref 13.3–17.7)
LDLC SERPL CALC-MCNC: 127 MG/DL
LYMPHOCYTES # BLD AUTO: 1.2 10E3/UL (ref 0.8–5.3)
LYMPHOCYTES NFR BLD AUTO: 23 %
MCH RBC QN AUTO: 29.5 PG (ref 26.5–33)
MCHC RBC AUTO-ENTMCNC: 33 G/DL (ref 31.5–36.5)
MCV RBC AUTO: 90 FL (ref 78–100)
MONOCYTES # BLD AUTO: 0.7 10E3/UL (ref 0–1.3)
MONOCYTES NFR BLD AUTO: 13 %
NEUTROPHILS # BLD AUTO: 3 10E3/UL (ref 1.6–8.3)
NEUTROPHILS NFR BLD AUTO: 60 %
NONHDLC SERPL-MCNC: 153 MG/DL
PLATELET # BLD AUTO: 214 10E3/UL (ref 150–450)
POTASSIUM BLD-SCNC: 4.2 MMOL/L (ref 3.4–5.3)
PROT SERPL-MCNC: 8.3 G/DL (ref 6.8–8.8)
RBC # BLD AUTO: 4.98 10E6/UL (ref 4.4–5.9)
SODIUM SERPL-SCNC: 136 MMOL/L (ref 133–144)
TRIGL SERPL-MCNC: 130 MG/DL
TSH SERPL DL<=0.005 MIU/L-ACNC: 1.14 MU/L (ref 0.4–4)
WBC # BLD AUTO: 5.1 10E3/UL (ref 4–11)

## 2022-02-09 PROCEDURE — 99215 OFFICE O/P EST HI 40 MIN: CPT | Performed by: NURSE PRACTITIONER

## 2022-02-09 PROCEDURE — 82306 VITAMIN D 25 HYDROXY: CPT | Mod: ZL | Performed by: NURSE PRACTITIONER

## 2022-02-09 PROCEDURE — 84443 ASSAY THYROID STIM HORMONE: CPT | Mod: ZL | Performed by: NURSE PRACTITIONER

## 2022-02-09 PROCEDURE — 36415 COLL VENOUS BLD VENIPUNCTURE: CPT | Mod: ZL | Performed by: NURSE PRACTITIONER

## 2022-02-09 PROCEDURE — 80061 LIPID PANEL: CPT | Mod: ZL | Performed by: NURSE PRACTITIONER

## 2022-02-09 PROCEDURE — G0463 HOSPITAL OUTPT CLINIC VISIT: HCPCS

## 2022-02-09 PROCEDURE — 85004 AUTOMATED DIFF WBC COUNT: CPT | Mod: ZL | Performed by: NURSE PRACTITIONER

## 2022-02-09 PROCEDURE — 80053 COMPREHEN METABOLIC PANEL: CPT | Mod: ZL | Performed by: NURSE PRACTITIONER

## 2022-02-09 PROCEDURE — 82040 ASSAY OF SERUM ALBUMIN: CPT | Mod: ZL | Performed by: NURSE PRACTITIONER

## 2022-02-09 RX ORDER — VENLAFAXINE 75 MG/1
TABLET ORAL
Qty: 270 TABLET | Refills: 1 | Status: SHIPPED | OUTPATIENT
Start: 2022-02-09

## 2022-02-09 RX ORDER — BETAMETHASONE DIPROPIONATE 0.5 MG/G
CREAM TOPICAL
Qty: 45 G | Refills: 3 | Status: SHIPPED | OUTPATIENT
Start: 2022-02-09 | End: 2022-06-07

## 2022-02-09 RX ORDER — CLONAZEPAM 1 MG/1
TABLET ORAL
Qty: 90 TABLET | Refills: 1 | Status: SHIPPED | OUTPATIENT
Start: 2022-02-09 | End: 2022-04-08

## 2022-02-09 RX ORDER — LISINOPRIL 30 MG/1
30 TABLET ORAL DAILY
Qty: 90 TABLET | Refills: 1 | Status: SHIPPED | OUTPATIENT
Start: 2022-02-09

## 2022-02-09 RX ORDER — METOPROLOL SUCCINATE 50 MG/1
50 TABLET, EXTENDED RELEASE ORAL 2 TIMES DAILY
Qty: 180 TABLET | Refills: 1 | Status: SHIPPED | OUTPATIENT
Start: 2022-02-09

## 2022-02-09 RX ORDER — SIMVASTATIN 40 MG
TABLET ORAL
Qty: 135 TABLET | Refills: 1 | Status: SHIPPED | OUTPATIENT
Start: 2022-02-09

## 2022-02-09 ASSESSMENT — ANXIETY QUESTIONNAIRES
2. NOT BEING ABLE TO STOP OR CONTROL WORRYING: NEARLY EVERY DAY
IF YOU CHECKED OFF ANY PROBLEMS ON THIS QUESTIONNAIRE, HOW DIFFICULT HAVE THESE PROBLEMS MADE IT FOR YOU TO DO YOUR WORK, TAKE CARE OF THINGS AT HOME, OR GET ALONG WITH OTHER PEOPLE: VERY DIFFICULT
1. FEELING NERVOUS, ANXIOUS, OR ON EDGE: NEARLY EVERY DAY
5. BEING SO RESTLESS THAT IT IS HARD TO SIT STILL: MORE THAN HALF THE DAYS
6. BECOMING EASILY ANNOYED OR IRRITABLE: MORE THAN HALF THE DAYS
3. WORRYING TOO MUCH ABOUT DIFFERENT THINGS: NEARLY EVERY DAY
GAD7 TOTAL SCORE: 19
4. TROUBLE RELAXING: NEARLY EVERY DAY
7. FEELING AFRAID AS IF SOMETHING AWFUL MIGHT HAPPEN: NEARLY EVERY DAY

## 2022-02-09 ASSESSMENT — PATIENT HEALTH QUESTIONNAIRE - PHQ9: SUM OF ALL RESPONSES TO PHQ QUESTIONS 1-9: 15

## 2022-02-09 NOTE — NURSING NOTE
"Chief Complaint   Patient presents with     Chronic Disease Management       Initial /88 (BP Location: Left arm, Patient Position: Sitting, Cuff Size: Adult Large)   Pulse 84   Temp 97.4  F (36.3  C) (Tympanic)   Resp 20   Wt 138.1 kg (304 lb 6.4 oz)   SpO2 95%   BMI 42.46 kg/m   Estimated body mass index is 42.46 kg/m  as calculated from the following:    Height as of 2/16/21: 1.803 m (5' 11\").    Weight as of this encounter: 138.1 kg (304 lb 6.4 oz).  Medication Reconciliation: complete  Odilia Linton LPN  "

## 2022-02-09 NOTE — PATIENT INSTRUCTIONS
Assessment & Plan       Benign essential hypertension  - TSH with free T4 reflex; Future  - metoprolol succinate ER (TOPROL-XL) 50 MG 24 hr tablet; Take 1 tablet (50 mg) by mouth 2 times daily  - lisinopril (ZESTRIL) 30 MG tablet; Take 1 tablet (30 mg) by mouth daily  - TSH with free T4 reflex  - CBC with platelets and differential  - Comprehensive metabolic panel      Hyperlipidemia LDL goal <100  - simvastatin (ZOCOR) 40 MG tablet; 1.5 tabs daily for 60 mg  - Comprehensive metabolic panel  - Lipid Profile (Chol, Trig, HDL, LDL calc)      Migraine without aura and without status migrainosus, not intractable  - Follow-up as needed      Social anxiety disorder  - venlafaxine (EFFEXOR) 75 MG tablet; TAKE 1 TABLET(75 MG) BY MOUTH THREE TIMES DAILY  - clonazePAM (KLONOPIN) 1 MG tablet; TAKE 1 TABLET BY MOUTH THREE TIMES DAILY PRN FOR ANXIETY      Mild episode of recurrent major depressive disorder (H)  - venlafaxine (EFFEXOR) 75 MG tablet; TAKE 1 TABLET(75 MG) BY MOUTH THREE TIMES DAILY      ACP (advance care planning)  - Discussed      Vitamin D deficiency  - Vitamin D level  - D3 5000 U daily      Read up on intermittent fasting  Increase activity level         Return in about 6 months (around 8/9/2022).        Yelitza Roche CNP  [facility]

## 2022-02-10 LAB — DEPRECATED CALCIDIOL+CALCIFEROL SERPL-MC: 81 UG/L (ref 20–75)

## 2022-02-10 ASSESSMENT — ANXIETY QUESTIONNAIRES: GAD7 TOTAL SCORE: 19

## 2022-02-11 NOTE — RESULT ENCOUNTER NOTE
Hold Vitamin D  Have him in next week for an office visit to discuss LFTs  Will recheck levels, also Glucose was elevated so will need an A1C.    [facility]itza [facility]KINSUp[facility]  [phone]

## 2022-03-25 DIAGNOSIS — I10 BENIGN ESSENTIAL HYPERTENSION: ICD-10-CM

## 2022-03-25 RX ORDER — LISINOPRIL 20 MG/1
TABLET ORAL
Qty: 90 TABLET | Refills: 3 | OUTPATIENT
Start: 2022-03-25

## 2022-04-07 DIAGNOSIS — F40.10 SOCIAL ANXIETY DISORDER: ICD-10-CM

## 2022-04-08 RX ORDER — CLONAZEPAM 1 MG/1
TABLET ORAL
Qty: 90 TABLET | Refills: 0 | Status: SHIPPED | OUTPATIENT
Start: 2022-04-08 | End: 2022-05-09

## 2022-04-08 NOTE — TELEPHONE ENCOUNTER
Klonopin      Last Written Prescription Date:  2/9/22  Last Fill Quantity: 90,   # refills: 1  Last Office Visit: 2/9/22  Future Office visit:       Routing refill request to provider for review/approval because:

## 2022-05-08 DIAGNOSIS — F40.10 SOCIAL ANXIETY DISORDER: ICD-10-CM

## 2022-05-09 RX ORDER — CLONAZEPAM 1 MG/1
TABLET ORAL
Qty: 90 TABLET | Refills: 3 | Status: SHIPPED | OUTPATIENT
Start: 2022-05-09

## 2022-05-09 NOTE — TELEPHONE ENCOUNTER
Klonopin       Last Written Prescription Date:  4-8-22  Last Fill Quantity: 90,   # refills: 0  Last Office Visit: 2-9-22  Future Office visit:

## 2022-06-06 DIAGNOSIS — L92.0 GRANULOMA ANNULARE: ICD-10-CM

## 2022-06-07 RX ORDER — BETAMETHASONE DIPROPIONATE 0.5 MG/G
CREAM TOPICAL
Qty: 45 G | Refills: 0 | Status: SHIPPED | OUTPATIENT
Start: 2022-06-07 | End: 2022-07-12

## 2022-06-07 NOTE — TELEPHONE ENCOUNTER
Diprosone      Last Written Prescription Date:  5.9.22  Last Fill Quantity: #45g,   # refills: 0  Last Office Visit: 2.9.22  Future Office visit:       Routing refill request to provider for review/approval because:  Drug not on the [facility], [facility] or [facility] refill protocol or controlled substance

## 2022-07-09 DIAGNOSIS — L92.0 GRANULOMA ANNULARE: ICD-10-CM

## 2022-07-12 RX ORDER — BETAMETHASONE DIPROPIONATE 0.5 MG/G
CREAM TOPICAL
Qty: 45 G | Refills: 0 | Status: SHIPPED | OUTPATIENT
Start: 2022-07-12 | End: 2022-08-08

## 2022-07-12 NOTE — TELEPHONE ENCOUNTER
betamethasone      Last Written Prescription Date:  6/7/22  Last Fill Quantity: 45 g,   # refills: 0  Last Office Visit: 2/9/22  Future Office visit:

## 2022-09-04 ENCOUNTER — HEALTH MAINTENANCE LETTER (OUTPATIENT)
Age: 47
End: 2022-09-04

## 2023-02-27 ENCOUNTER — PATIENT OUTREACH (OUTPATIENT)
Dept: OTHER | Facility: HOSPITAL | Age: 48
End: 2023-02-27

## 2023-02-27 NOTE — TELEPHONE ENCOUNTER
Patient Quality Outreach    Patient is due for the following:   Depression  -  PHQ-9 needed    Next Steps:   Patient was assigned appropriate questionnaire to complete    Type of outreach:    Sent [employer] message.      Questions for provider review:    None     Liana Brown RN

## 2023-04-29 ENCOUNTER — HEALTH MAINTENANCE LETTER (OUTPATIENT)
Age: 48
End: 2023-04-29

## 2024-07-07 ENCOUNTER — HEALTH MAINTENANCE LETTER (OUTPATIENT)
Age: 49
End: 2024-07-07

## 2025-07-13 ENCOUNTER — HEALTH MAINTENANCE LETTER (OUTPATIENT)
Age: 50
End: 2025-07-13